# Patient Record
Sex: FEMALE | Race: WHITE | Employment: FULL TIME | URBAN - METROPOLITAN AREA
[De-identification: names, ages, dates, MRNs, and addresses within clinical notes are randomized per-mention and may not be internally consistent; named-entity substitution may affect disease eponyms.]

---

## 2017-01-06 ENCOUNTER — OFFICE VISIT (OUTPATIENT)
Dept: PSYCHOLOGY | Facility: CLINIC | Age: 32
End: 2017-01-06
Payer: COMMERCIAL

## 2017-01-06 DIAGNOSIS — F41.9 ANXIETY: Primary | ICD-10-CM

## 2017-01-06 PROCEDURE — 90834 PSYTX W PT 45 MINUTES: CPT | Performed by: MARRIAGE & FAMILY THERAPIST

## 2017-01-06 ASSESSMENT — ANXIETY QUESTIONNAIRES
3. WORRYING TOO MUCH ABOUT DIFFERENT THINGS: SEVERAL DAYS
7. FEELING AFRAID AS IF SOMETHING AWFUL MIGHT HAPPEN: NOT AT ALL
5. BEING SO RESTLESS THAT IT IS HARD TO SIT STILL: SEVERAL DAYS
6. BECOMING EASILY ANNOYED OR IRRITABLE: MORE THAN HALF THE DAYS
1. FEELING NERVOUS, ANXIOUS, OR ON EDGE: MORE THAN HALF THE DAYS
GAD7 TOTAL SCORE: 9
2. NOT BEING ABLE TO STOP OR CONTROL WORRYING: SEVERAL DAYS

## 2017-01-06 ASSESSMENT — PATIENT HEALTH QUESTIONNAIRE - PHQ9: 5. POOR APPETITE OR OVEREATING: MORE THAN HALF THE DAYS

## 2017-01-06 NOTE — PROGRESS NOTES
Progress Note    Client Name: Aimee M Welander  Date: 1/6/17         Service Type: Individual      Session Start Time: 10:00  Session End Time: 10:52      Session Length: 38-52     Session #: 10     Attendees: Client    Treatment Plan Last Reviewed: n/a  PHQ-9 / BELL-7: completed  LOCUS / CASII: n/a     DATA      Progress Since Last Session (Related to Symptoms / Goals / Homework):   Symptoms: Stable    Homework: Achieved / completed to satisfaction      Episode of Care Goals: Satisfactory progress - ACTION (Actively working towards change); Intervened by reinforcing change plan / affirming steps taken     Current / Ongoing Stressors and Concerns:  Client is proud about several boundaries she has set with people.  Continues to work on organization which helps balance emotional health.  She feels optimistic about starting a new therapist for daughter.     Treatment Objective(s) Addressed in This Session:   Client will Identify negative self-talk and behaviors: challenge core beliefs, myths, and actions.     Intervention:   CBT: wise mind strategies, cognitive modification        ASSESSMENT: Current Emotional / Mental Status (status of significant symptoms):   Risk status (Self / Other harm or suicidal ideation)    Client denies current fears or concerns for personal safety.  Client denies current or recent suicidal ideation or behaviors.   Client denies current or recent homicidal ideation or behaviors.  Client denies current or recent self injurious behavior or ideation.  Client denies other safety concerns.  Client reports there are no firearms in the house.  A safety and risk management plan has been developed.     Appearance:   Appropriate    Eye Contact:   Fair    Psychomotor Behavior: Normal    Attitude:   Cooperative    Orientation:   All   Speech    Rate / Production: Normal     Volume:  Normal    Mood:    Anxious  Normal   Affect:    Appropriate    Thought  Content:  Clear    Thought Form:  Coherent  Logical    Insight:    Fair      Medication Review:   No changes to current psychiatric medication(s)     Medication Compliance:   No client stopped all psychotropic medications     Changes in Health Issues:   None reported     Chemical Use Review:   Substance Use: Chemical use reviewed, no active concerns identified      Tobacco Use: Yes, decrease.  Client reports frequency of use daily with an e-cigarette. Provided support and affirmation for steps taken towards quiting      Collateral Reports Completed:   Not Applicable    PLAN: (Client Tasks / Therapist Tasks / Other)  1.  Focus on body reactions to stress.  2.  Set boundaries with others.      Kendra Gilliland, LMFT                                                         ________________________________________________________________________    Treatment Plan    Client's Name: Aimee M Welander  YOB: 1985    Date: 9/9/16    Diagnoses: 296.32 Major Depressive Disorder, Recurrent Episode, Moderate _  300.00 (F41.9) Unspecified Anxiety Disorder  Psychosocial & Contextual Factors: relationship conflicts, trauma history  WHODAS: 29    Referral / Collaboration:  Referral to another professional/service is not indicated at this time..    Anticipated number of session or this episode of care: 12-24      MeasurableTreatment Goal(s) related to diagnosis / functional impairment(s)  Goal 1: Client will keep self safe and eliminate suicidal ideation and self-harm behaviors.    Objective #A (Client Action)    Client will make a list of at least 10 skills or activities that you will to use to distract from urges to harm self.  Status: Completed - Date: 12/16/16     Intervention(s)  Therapist will provide ideas and strategies for generating coping skills list.    Objective #B  Client will make a list of pros and cons for tolerating and not tolerating an urge to harm self.  Status: Completed - Date: 12/16/16      Intervention(s)  Therapist will guide client through DBT-style Pros/Cons list for behavior change.    Objective #C  Client will identify and practice the new strategies for dealing with strong emotions, learn and practice relaxation breathing.  Status: Continued - Date(s): 12/16/16     Intervention(s)  Therapist will teach distraction skills. Practice them within session and outside of session.    Goal 2: Client will report reduction in anxiety also as evidenced by reduction of BELL-7 score below 6 points within the next 12 weeks.    Objective #A (Client Action)    Client will identify at least three triggers for anxiety.  Status: Completed - Date: 12/16/16     Intervention(s)  Therapist will provide educational materials on common triggers and signs of anxiety.    Objective #B  Client will use relaxation strategies at least two times per day to reduce the physical symptoms of anxiety.  Status: Continued - Date(s): 12/16/16     Intervention(s)  Therapist will teach relaxation strategies such as mindfulness, deep breathing, muscle relaxation, and sensory activities.    Objective #C  Client will use cognitive strategies identified in therapy to challenge anxious thoughts.  Status: Continued - Date(s): 12/16/16     Intervention(s)  Therapist will teach strategies for cognitive modification using REBT model.    Goal 3: Client will report improved mood as indicated by PHQ-9 score below 6 for consistent 8 weeks.    Objective #A (Client Action)    Status: Continued- Date: 12/16/16    Client will Increase interest, engagement, and pleasure in doing things.    Intervention(s)  Therapist will assign homework for daily involvement in pleasant activities.    Objective #B  Client will Identify negative self-talk and behaviors: challenge core beliefs, myths, and actions.    Status: Continued - Date(s): 12/16/16     Intervention(s)  Therapist will teach strategies for cognitive modification using REBT models.    Objective  #C  Client will Improve quantity and quality of night time sleep.  Status: Continued - Date(s):12/16/16     Intervention(s)  Therapist will teach sleep hygiene strategies.  Assign homework for daily practice.      Client has reviewed and agreed to the above plan.      Kendra Gilliland, LMFT  September 9, 2016

## 2017-01-07 ASSESSMENT — ANXIETY QUESTIONNAIRES: GAD7 TOTAL SCORE: 9

## 2017-01-07 ASSESSMENT — PATIENT HEALTH QUESTIONNAIRE - PHQ9: SUM OF ALL RESPONSES TO PHQ QUESTIONS 1-9: 12

## 2017-01-20 ENCOUNTER — OFFICE VISIT (OUTPATIENT)
Dept: PSYCHOLOGY | Facility: CLINIC | Age: 32
End: 2017-01-20
Payer: COMMERCIAL

## 2017-01-20 DIAGNOSIS — F33.1 MAJOR DEPRESSIVE DISORDER, RECURRENT EPISODE, MODERATE (H): Primary | ICD-10-CM

## 2017-01-20 PROCEDURE — 90834 PSYTX W PT 45 MINUTES: CPT | Performed by: MARRIAGE & FAMILY THERAPIST

## 2017-01-20 ASSESSMENT — ANXIETY QUESTIONNAIRES
2. NOT BEING ABLE TO STOP OR CONTROL WORRYING: MORE THAN HALF THE DAYS
5. BEING SO RESTLESS THAT IT IS HARD TO SIT STILL: SEVERAL DAYS
1. FEELING NERVOUS, ANXIOUS, OR ON EDGE: SEVERAL DAYS
3. WORRYING TOO MUCH ABOUT DIFFERENT THINGS: MORE THAN HALF THE DAYS
GAD7 TOTAL SCORE: 12
6. BECOMING EASILY ANNOYED OR IRRITABLE: MORE THAN HALF THE DAYS
7. FEELING AFRAID AS IF SOMETHING AWFUL MIGHT HAPPEN: SEVERAL DAYS

## 2017-01-20 ASSESSMENT — PATIENT HEALTH QUESTIONNAIRE - PHQ9: 5. POOR APPETITE OR OVEREATING: NEARLY EVERY DAY

## 2017-01-20 NOTE — PROGRESS NOTES
"                                           Progress Note    Client Name: Aimee M Welander  Date: 1/20/17         Service Type: Individual      Session Start Time: 10:00  Session End Time: 10:52      Session Length: 38-52     Session #: 11     Attendees: Client    Treatment Plan Last Reviewed: n/a  PHQ-9 / BELL-7: completed  LOCUS / CASII: n/a     DATA      Progress Since Last Session (Related to Symptoms / Goals / Homework):   Symptoms: Stable    Homework: Achieved / completed to satisfaction      Episode of Care Goals: Satisfactory progress - ACTION (Actively working towards change); Intervened by reinforcing change plan / affirming steps taken     Current / Ongoing Stressors and Concerns:  Client is continuing to set boundaries.  She notices a belief of \"I don't matter\" that comes when she speaks and believes no one is listening.  Image in her mind is of self on a rooftop screaming with everyone's backs turned.       Treatment Objective(s) Addressed in This Session:   Client will Identify negative self-talk and behaviors: challenge core beliefs, myths, and actions.     Intervention:   CBT: wise mind strategies, cognitive modification.  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  Brief introduction to EMDR.        ASSESSMENT: Current Emotional / Mental Status (status of significant symptoms):   Risk status (Self / Other harm or suicidal ideation)    Client denies current fears or concerns for personal safety.  Client denies current or recent suicidal ideation or behaviors.   Client denies current or recent homicidal ideation or behaviors.  Client denies current or recent self injurious behavior or ideation.  Client denies other safety concerns.  Client reports there are no firearms in the house.  A safety and risk management plan has been developed.     Appearance:   Appropriate    Eye Contact:   Fair    Psychomotor Behavior: Normal    Attitude:   Cooperative    Orientation:   All   Speech    Rate / " Production: Normal     Volume:  Normal    Mood:    Anxious  Normal   Affect:    Appropriate    Thought Content:  Clear    Thought Form:  Coherent  Logical    Insight:    Fair      Medication Review:   No changes to current psychiatric medication(s)     Medication Compliance:   No client stopped all psychotropic medications     Changes in Health Issues:   None reported     Chemical Use Review:   Substance Use: Chemical use reviewed, no active concerns identified      Tobacco Use: Yes, decrease.  Client reports frequency of use daily with an e-cigarette. Provided support and affirmation for steps taken towards quiting      Collateral Reports Completed:   Not Applicable    PLAN: (Client Tasks / Therapist Tasks / Other)  1.  Introduction to EMDR next session with resourcing practice.      Kendra Gilliland, LMFT                                                         ________________________________________________________________________    Treatment Plan    Client's Name: Aimee M Welander  YOB: 1985    Date: 9/9/16    Diagnoses: 296.32 Major Depressive Disorder, Recurrent Episode, Moderate _  300.00 (F41.9) Unspecified Anxiety Disorder  Psychosocial & Contextual Factors: relationship conflicts, trauma history  WHODAS: 29    Referral / Collaboration:  Referral to another professional/service is not indicated at this time..    Anticipated number of session or this episode of care: 12-24      MeasurableTreatment Goal(s) related to diagnosis / functional impairment(s)  Goal 1: Client will keep self safe and eliminate suicidal ideation and self-harm behaviors.    Objective #A (Client Action)    Client will make a list of at least 10 skills or activities that you will to use to distract from urges to harm self.  Status: Completed - Date: 12/16/16     Intervention(s)  Therapist will provide ideas and strategies for generating coping skills list.    Objective #B  Client will make a list of pros and cons for  tolerating and not tolerating an urge to harm self.  Status: Completed - Date: 12/16/16     Intervention(s)  Therapist will guide client through DBT-style Pros/Cons list for behavior change.    Objective #C  Client will identify and practice the new strategies for dealing with strong emotions, learn and practice relaxation breathing.  Status: Continued - Date(s): 12/16/16     Intervention(s)  Therapist will teach distraction skills. Practice them within session and outside of session.    Goal 2: Client will report reduction in anxiety also as evidenced by reduction of BELL-7 score below 6 points within the next 12 weeks.    Objective #A (Client Action)    Client will identify at least three triggers for anxiety.  Status: Completed - Date: 12/16/16     Intervention(s)  Therapist will provide educational materials on common triggers and signs of anxiety.    Objective #B  Client will use relaxation strategies at least two times per day to reduce the physical symptoms of anxiety.  Status: Continued - Date(s): 12/16/16     Intervention(s)  Therapist will teach relaxation strategies such as mindfulness, deep breathing, muscle relaxation, and sensory activities.    Objective #C  Client will use cognitive strategies identified in therapy to challenge anxious thoughts.  Status: Continued - Date(s): 12/16/16     Intervention(s)  Therapist will teach strategies for cognitive modification using REBT model.    Goal 3: Client will report improved mood as indicated by PHQ-9 score below 6 for consistent 8 weeks.    Objective #A (Client Action)    Status: Continued- Date: 12/16/16    Client will Increase interest, engagement, and pleasure in doing things.    Intervention(s)  Therapist will assign homework for daily involvement in pleasant activities.    Objective #B  Client will Identify negative self-talk and behaviors: challenge core beliefs, myths, and actions.    Status: Continued - Date(s): 12/16/16      Intervention(s)  Therapist will teach strategies for cognitive modification using REBT models.    Objective #C  Client will Improve quantity and quality of night time sleep.  Status: Continued - Date(s):12/16/16     Intervention(s)  Therapist will teach sleep hygiene strategies.  Assign homework for daily practice.      Client has reviewed and agreed to the above plan.      Kendra Gilliland, LMFT  September 9, 2016

## 2017-01-21 ASSESSMENT — ANXIETY QUESTIONNAIRES: GAD7 TOTAL SCORE: 12

## 2017-01-21 ASSESSMENT — PATIENT HEALTH QUESTIONNAIRE - PHQ9: SUM OF ALL RESPONSES TO PHQ QUESTIONS 1-9: 8

## 2017-01-25 ENCOUNTER — MYC MEDICAL ADVICE (OUTPATIENT)
Dept: FAMILY MEDICINE | Facility: CLINIC | Age: 32
End: 2017-01-25

## 2017-01-25 DIAGNOSIS — F41.9 ANXIETY: Primary | ICD-10-CM

## 2017-02-03 ENCOUNTER — OFFICE VISIT (OUTPATIENT)
Dept: PSYCHOLOGY | Facility: CLINIC | Age: 32
End: 2017-02-03
Payer: COMMERCIAL

## 2017-02-03 DIAGNOSIS — F33.1 MAJOR DEPRESSIVE DISORDER, RECURRENT EPISODE, MODERATE (H): Primary | ICD-10-CM

## 2017-02-03 PROCEDURE — 90834 PSYTX W PT 45 MINUTES: CPT | Performed by: MARRIAGE & FAMILY THERAPIST

## 2017-02-03 ASSESSMENT — ANXIETY QUESTIONNAIRES
3. WORRYING TOO MUCH ABOUT DIFFERENT THINGS: MORE THAN HALF THE DAYS
7. FEELING AFRAID AS IF SOMETHING AWFUL MIGHT HAPPEN: NOT AT ALL
1. FEELING NERVOUS, ANXIOUS, OR ON EDGE: MORE THAN HALF THE DAYS
2. NOT BEING ABLE TO STOP OR CONTROL WORRYING: MORE THAN HALF THE DAYS
5. BEING SO RESTLESS THAT IT IS HARD TO SIT STILL: SEVERAL DAYS
GAD7 TOTAL SCORE: 11
6. BECOMING EASILY ANNOYED OR IRRITABLE: NEARLY EVERY DAY

## 2017-02-03 ASSESSMENT — PATIENT HEALTH QUESTIONNAIRE - PHQ9: 5. POOR APPETITE OR OVEREATING: SEVERAL DAYS

## 2017-02-03 NOTE — PROGRESS NOTES
"                                           Progress Note    Client Name: Aimee M Welander  Date: 2/3/17         Service Type: Individual      Session Start Time: 10:00  Session End Time: 10:43      Session Length: 38-52     Session #: 12     Attendees: Client    Treatment Plan Last Reviewed: n/a  PHQ-9 / BELL-7: completed  LOCUS / CASII: n/a     DATA      Progress Since Last Session (Related to Symptoms / Goals / Homework):   Symptoms: Stable    Homework: Achieved / completed to satisfaction      Episode of Care Goals: Satisfactory progress - ACTION (Actively working towards change); Intervened by reinforcing change plan / affirming steps taken     Current / Ongoing Stressors and Concerns:  Client is eager to start EMDR after researching it independently.  She was receptive to the [facility] and identified a cue word \"secure.\"     Treatment Objective(s) Addressed in This Session:   Client will successfully maintain focus during imageries such as Container, Calm Place, and Light Stream.     Intervention:   EMDR: Introduction to EMDR; [facility]m Place imagery with bilateral stimulation.          ASSESSMENT: Current Emotional / Mental Status (status of significant symptoms):   Risk status (Self / Other harm or suicidal ideation)    Client denies current fears or concerns for personal safety.  Client denies current or recent suicidal ideation or behaviors.   Client denies current or recent homicidal ideation or behaviors.  Client denies current or recent self injurious behavior or ideation.  Client denies other safety concerns.  Client reports there are no firearms in the house.  A safety and risk management plan has been developed.     Appearance:   Appropriate    Eye Contact:   Fair    Psychomotor Behavior: Normal    Attitude:   Cooperative    Orientation:   All   Speech    Rate / Production: Normal     Volume:  Normal    Mood:    Anxious  Normal   Affect:    Appropriate    Thought Content:  Clear    Thought Form:  Coherent  " Logical    Insight:    Fair      Medication Review:   No changes to current psychiatric medication(s)     Medication Compliance:   No client stopped all psychotropic medications     Changes in Health Issues:   None reported     Chemical Use Review:   Substance Use: Chemical use reviewed, no active concerns identified      Tobacco Use: Yes, decrease.  Client reports frequency of use daily with an e-cigarette. Provided support and affirmation for steps taken towards quiting      Collateral Reports Completed:   Not Applicable    PLAN: (Client Tasks / Therapist Tasks / Other)  1. Use Calm Place imagery.  2.  Consider other resourcing imageries or start treatment plan sequencing.    Kendra Gilliland, LMFT                                                         ________________________________________________________________________    Treatment Plan    Client's Name: Aimee M Welander  YOB: 1985    Date: 9/9/16    Diagnoses: 296.32 Major Depressive Disorder, Recurrent Episode, Moderate _  300.00 (F41.9) Unspecified Anxiety Disorder  Psychosocial & Contextual Factors: relationship conflicts, trauma history  WHODAS: 29    Referral / Collaboration:  Referral to another professional/service is not indicated at this time..    Anticipated number of session or this episode of care: 12-24      MeasurableTreatment Goal(s) related to diagnosis / functional impairment(s)  Goal 1: Client will keep self safe and eliminate suicidal ideation and self-harm behaviors.    Objective #A (Client Action)    Client will make a list of at least 10 skills or activities that you will to use to distract from urges to harm self.  Status: Completed - Date: 12/16/16     Intervention(s)  Therapist will provide ideas and strategies for generating coping skills list.    Objective #B  Client will make a list of pros and cons for tolerating and not tolerating an urge to harm self.  Status: Completed - Date: 12/16/16      Intervention(s)  Therapist will guide client through DBT-style Pros/Cons list for behavior change.    Objective #C  Client will identify and practice the new strategies for dealing with strong emotions, learn and practice relaxation breathing.  Status: Continued - Date(s): 12/16/16     Intervention(s)  Therapist will teach distraction skills. Practice them within session and outside of session.    Goal 2: Client will report reduction in anxiety also as evidenced by reduction of BELL-7 score below 6 points within the next 12 weeks.    Objective #A (Client Action)    Client will identify at least three triggers for anxiety.  Status: Completed - Date: 12/16/16     Intervention(s)  Therapist will provide educational materials on common triggers and signs of anxiety.    Objective #B  Client will use relaxation strategies at least two times per day to reduce the physical symptoms of anxiety.  Status: Continued - Date(s): 12/16/16     Intervention(s)  Therapist will teach relaxation strategies such as mindfulness, deep breathing, muscle relaxation, and sensory activities.    Objective #C  Client will use cognitive strategies identified in therapy to challenge anxious thoughts.  Status: Continued - Date(s): 12/16/16     Intervention(s)  Therapist will teach strategies for cognitive modification using REBT model.    Goal 3: Client will report improved mood as indicated by PHQ-9 score below 6 for consistent 8 weeks.    Objective #A (Client Action)    Status: Continued- Date: 12/16/16    Client will Increase interest, engagement, and pleasure in doing things.    Intervention(s)  Therapist will assign homework for daily involvement in pleasant activities.    Objective #B  Client will Identify negative self-talk and behaviors: challenge core beliefs, myths, and actions.    Status: Continued - Date(s): 12/16/16     Intervention(s)  Therapist will teach strategies for cognitive modification using REBT models.    Objective  #C  Client will Improve quantity and quality of night time sleep.  Status: Continued - Date(s):12/16/16     Intervention(s)  Therapist will teach sleep hygiene strategies.  Assign homework for daily practice.    Goal 4: Client will report reduced trauma responses including decreased re-experiencing, decreased avoidance, and decreased hyperarousal.    Objective #A (Client Action)    Client will successful maintain focus during imageries such as Container, Calm Place, and Light Stream.  Status: New - Date: 2/3/17     Intervention(s)  Therapist will provide practice of imagery and mindfulness during session.  Assign homework for practice of the imagery outside of session.    Objective #B  Client will provide history of trauma or distressing events and how these impact the cognitions and feelings about self.    Status: New - Date: 2/3/17     Intervention(s)  Therapist will assist client in identifying how the past stressors are impacting currenting cognitive, behavioral, and emotional functioning.    Objective #C  Client will use bilateral stimulation while recalling distressing memories and focusing on new, helpful thoughts and feelings.  Status: New - Date: 2/3/17     Intervention(s)  Therapist will provide bilateral stimulation through touch or eye movement and elicit feedback from client about SUDs scale ratings.      Client has reviewed and agreed to the above plan.      Kendra Gilliland, LMFT  September 9, 2016

## 2017-02-04 ASSESSMENT — PATIENT HEALTH QUESTIONNAIRE - PHQ9: SUM OF ALL RESPONSES TO PHQ QUESTIONS 1-9: 7

## 2017-02-04 ASSESSMENT — ANXIETY QUESTIONNAIRES: GAD7 TOTAL SCORE: 11

## 2017-02-09 ENCOUNTER — OFFICE VISIT (OUTPATIENT)
Dept: INTERNAL MEDICINE | Facility: CLINIC | Age: 32
End: 2017-02-09
Payer: COMMERCIAL

## 2017-02-09 ENCOUNTER — TELEPHONE (OUTPATIENT)
Dept: INTERNAL MEDICINE | Facility: CLINIC | Age: 32
End: 2017-02-09

## 2017-02-09 VITALS
WEIGHT: 194 LBS | SYSTOLIC BLOOD PRESSURE: 113 MMHG | HEART RATE: 82 BPM | BODY MASS INDEX: 32.32 KG/M2 | HEIGHT: 65 IN | OXYGEN SATURATION: 98 % | DIASTOLIC BLOOD PRESSURE: 70 MMHG | TEMPERATURE: 97.6 F

## 2017-02-09 DIAGNOSIS — N89.8 VAGINAL ITCHING: ICD-10-CM

## 2017-02-09 DIAGNOSIS — R35.0 URINARY FREQUENCY: Primary | ICD-10-CM

## 2017-02-09 DIAGNOSIS — J45.20 INTERMITTENT ASTHMA, UNCOMPLICATED: ICD-10-CM

## 2017-02-09 LAB
ALBUMIN UR-MCNC: NEGATIVE MG/DL
APPEARANCE UR: NORMAL
BILIRUB UR QL STRIP: NEGATIVE
COLOR UR AUTO: YELLOW
GLUCOSE UR STRIP-MCNC: NEGATIVE MG/DL
HGB UR QL STRIP: NEGATIVE
KETONES UR STRIP-MCNC: NEGATIVE MG/DL
LEUKOCYTE ESTERASE UR QL STRIP: NEGATIVE
MICRO REPORT STATUS: ABNORMAL
NITRATE UR QL: NEGATIVE
NON-SQ EPI CELLS #/AREA URNS LPF: NORMAL /LPF
PH UR STRIP: 7 PH (ref 5–7)
RBC #/AREA URNS AUTO: NORMAL /HPF (ref 0–2)
SP GR UR STRIP: 1.02 (ref 1–1.03)
SPECIMEN SOURCE: ABNORMAL
URN SPEC COLLECT METH UR: NORMAL
UROBILINOGEN UR STRIP-ACNC: 0.2 EU/DL (ref 0.2–1)
WBC #/AREA URNS AUTO: NORMAL /HPF (ref 0–2)
WET PREP SPEC: ABNORMAL

## 2017-02-09 PROCEDURE — 99213 OFFICE O/P EST LOW 20 MIN: CPT | Performed by: INTERNAL MEDICINE

## 2017-02-09 PROCEDURE — 87210 SMEAR WET MOUNT SALINE/INK: CPT | Performed by: INTERNAL MEDICINE

## 2017-02-09 PROCEDURE — 81001 URINALYSIS AUTO W/SCOPE: CPT | Performed by: INTERNAL MEDICINE

## 2017-02-09 RX ORDER — METRONIDAZOLE 500 MG/1
500 TABLET ORAL 2 TIMES DAILY
Qty: 14 TABLET | Refills: 0 | Status: SHIPPED | OUTPATIENT
Start: 2017-02-09 | End: 2017-04-28

## 2017-02-09 RX ORDER — ALBUTEROL SULFATE 90 UG/1
2 AEROSOL, METERED RESPIRATORY (INHALATION) EVERY 4 HOURS PRN
Qty: 1 INHALER | Refills: 12 | Status: SHIPPED | OUTPATIENT
Start: 2017-02-09 | End: 2018-11-23

## 2017-02-09 ASSESSMENT — ASTHMA QUESTIONNAIRES
QUESTION_3 LAST FOUR WEEKS HOW OFTEN DID YOUR ASTHMA SYMPTOMS (WHEEZING, COUGHING, SHORTNESS OF BREATH, CHEST TIGHTNESS OR PAIN) WAKE YOU UP AT NIGHT OR EARLIER THAN USUAL IN THE MORNING: NOT AT ALL
QUESTION_2 LAST FOUR WEEKS HOW OFTEN HAVE YOU HAD SHORTNESS OF BREATH: ONCE A DAY
QUESTION_4 LAST FOUR WEEKS HOW OFTEN HAVE YOU USED YOUR RESCUE INHALER OR NEBULIZER MEDICATION (SUCH AS ALBUTEROL): NOT AT ALL
ACT_TOTALSCORE: 18
QUESTION_1 LAST FOUR WEEKS HOW MUCH OF THE TIME DID YOUR ASTHMA KEEP YOU FROM GETTING AS MUCH DONE AT WORK, SCHOOL OR AT HOME: SOME OF THE TIME
QUESTION_5 LAST FOUR WEEKS HOW WOULD YOU RATE YOUR ASTHMA CONTROL: SOMEWHAT CONTROLLED

## 2017-02-09 NOTE — PATIENT INSTRUCTIONS
Please take the antibiotics and let us know if you are not better after you complete the course.  You may use acetaminophen, heating packs or ice packs for your abdominal pain.       Bacterial Vaginosis    You have a vaginal infection called bacterial vaginosis (BV). Both good and bad bacteria are present in a healthy vagina. BV occurs when these bacteria get out of balance. The number of bad bacteria increase. And the number of good bacteria decrease.  BV may or may not cause symptoms. If symptoms do occur, they can include:    Thin, gray, milky-white, or sometimes green discharge    Unpleasant odor or  fishy  smell    Itching, burning, or pain in or around the vagina  It is not known what causes BV, but certain factors can make the problem more likely. This can include:    Douching    Having sex with a new partner    Having sex with more than one partner  BV will sometimes go away on its own. But treatment is usually recommended. This is because untreated BV can increase the risk of more serious health problems such as:    Pelvic inflammatory disease (PID)     delivery (giving birth to a baby early if you re pregnant)    HIV and certain other sexually transmitted diseases (STDs)    Infection after surgery on the reproductive organs  Home care  General care    BV is most often treated with medicines called antibiotics. These may be given as pills or as a vaginal cream. If antibiotics are prescribed, be sure to use them exactly as directed. Also, be sure to complete all of the medicine, even if your symptoms go away.    Avoid douching or having sex during treatment.    If you have sex with a female partner, ask your healthcare provider if she should also be treated.  Prevention    Limit or avoid douching.    Avoid having sex. If you do have sex, then take steps to lower your risk:    Use condoms when having sex.    Limit the number of partners you have sex with.  Follow-up care  Follow up with your healthcare  provider, or as advised.  When to seek medical advice  Call your healthcare provider right away if:    You have a fever of 100.4 F (38 C) or higher, or as directed by your provider.    Your symptoms worsen, or they don t go away within a few days of starting treatment.    You have new pain in the lower belly or pelvic region.    You have side effects that bother you or a reaction to the pills or cream you re prescribed.    You or any partners you have sex with have new symptoms, such as a rash, joint pain, or sores.    3926-8970 [employer]. [address] [place] [id number]. All rights reserved. This information is not intended as a substitute for professional medical care. Always follow your healthcare professional's instructions.

## 2017-02-09 NOTE — NURSING NOTE
"Chief Complaint   Patient presents with     UTI     possible bladder infection       Initial /70 mmHg  Pulse 82  Temp(Src) 97.6  F (36.4  C) (Oral)  Ht 5' 4.5\" (1.638 m)  Wt 194 lb (87.998 kg)  BMI 32.80 kg/m2  SpO2 98%  LMP 01/12/2015 (Approximate) Estimated body mass index is 32.8 kg/(m^2) as calculated from the following:    Height as of this encounter: 5' 4.5\" (1.638 m).    Weight as of this encounter: 194 lb (87.998 kg).  BP completed using cuff size: marion Ford CMA    "

## 2017-02-09 NOTE — MR AVS SNAPSHOT
After Visit Summary   2017    Aimee M Welander    MRN: [id number]           Patient Information     Date Of Birth          1985        Visit Information        Provider Department      2017 1:30 PM Makenzei James MD [facility]        Today's Diagnoses     Urinary frequency    -  1     Intermittent asthma, uncomplicated         Vaginal itching           Care Instructions    Please take the antibiotics and let us know if you are not better after you complete the course.  You may use acetaminophen, heating packs or ice packs for your abdominal pain.       Bacterial Vaginosis    You have a vaginal infection called bacterial vaginosis (BV). Both good and bad bacteria are present in a healthy vagina. BV occurs when these bacteria get out of balance. The number of bad bacteria increase. And the number of good bacteria decrease.  BV may or may not cause symptoms. If symptoms do occur, they can include:    Thin, gray, milky-white, or sometimes green discharge    Unpleasant odor or  fishy  smell    Itching, burning, or pain in or around the vagina  It is not known what causes BV, but certain factors can make the problem more likely. This can include:    Douching    Having sex with a new partner    Having sex with more than one partner  BV will sometimes go away on its own. But treatment is usually recommended. This is because untreated BV can increase the risk of more serious health problems such as:    Pelvic inflammatory disease (PID)     delivery (giving birth to a baby early if you re pregnant)    HIV and certain other sexually transmitted diseases (STDs)    Infection after surgery on the reproductive organs  Home care  General care    BV is most often treated with medicines called antibiotics. These may be given as pills or as a vaginal cream. If antibiotics are prescribed, be sure to use them exactly as directed. Also, be sure to complete all of the medicine,  even if your symptoms go away.    Avoid douching or having sex during treatment.    If you have sex with a female partner, ask your healthcare provider if she should also be treated.  Prevention    Limit or avoid douching.    Avoid having sex. If you do have sex, then take steps to lower your risk:    Use condoms when having sex.    Limit the number of partners you have sex with.  Follow-up care  Follow up with your healthcare provider, or as advised.  When to seek medical advice  Call your healthcare provider right away if:    You have a fever of 100.4 F (38 C) or higher, or as directed by your provider.    Your symptoms worsen, or they don t go away within a few days of starting treatment.    You have new pain in the lower belly or pelvic region.    You have side effects that bother you or a reaction to the pills or cream you re prescribed.    You or any partners you have sex with have new symptoms, such as a rash, joint pain, or sores.    5707-7713 [employer]. [address] [place] [address]. All rights reserved. This information is not intended as a substitute for professional medical care. Always follow your healthcare professional's instructions.              Follow-ups after your visit        Your next 10 appointments already scheduled     Feb 17, 2017 11:00 AM   Return Visit with Kendra Gilliland [facility]    [address] [facility] [address] [phone]            Mar 03, 2017 10:00 AM   Return Visit with Kendra Gilliland [facility] ([facility])    [address] [facility] [id number] [phone]            Mar 17, 2017 11:00 AM   Return Visit with Kendra Gilliland [facility]    [address] [facility] [id number] [phone]              Who to contact     If you have questions or need follow up information about  "today's clinic visit or your schedule please contact [facility] directly at [phone].  Normal or non-critical lab and imaging results will be communicated to you by MyChart, letter or phone within 4 business days after the clinic has received the results. If you do not hear from us within 7 days, please contact the clinic through [employer] or phone. If you have a critical or abnormal lab result, we will notify you by phone as soon as possible.  Submit refill requests through [employer] or call your pharmacy and they will forward the refill request to us. Please allow 3 business days for your refill to be completed.          Additional Information About Your Visit        [employer]ap[employer]ar[employer]Angel Information     [employer] gives you secure access to your electronic health record. If you see a primary care provider, you can also send messages to your care team and make appointments. If you have questions, please call your primary care clinic.  If you do not have a primary care provider, please call [phone] and they will assist you.        Care EveryWhere ID     This is your Care EveryWhere ID. This could be used by other organizations to access your [facility] medical records  [id number]        Your Vitals Were     Pulse Temperature Height BMI (Body Mass Index) Pulse Oximetry Last Period    82 97.6  F (36.4  C) (Oral) 5' 4.5\" (1.638 m) 32.80 kg/m2 98% 01/12/2015 (Approximate)       Blood Pressure from Last 3 Encounters:   02/09/17 113/70   12/16/16 121/78   08/08/16 129/83    Weight from Last 3 Encounters:   02/09/17 194 lb (87.998 kg)   12/16/16 193 lb 3.2 oz (87.635 kg)   08/08/16 185 lb 12.8 oz (84.278 kg)              We Performed the Following     *UA [facility]scopic [facility] [place] [facility] (except [place] and [place])     Asthma Action Plan (AAP)     Urine Microscopic     Wet prep          Today's Medication Changes          These changes are accurate as of: 2/9/17  3:25 PM.  If " you have any questions, ask your nurse or doctor.               Start taking these medicines.        Dose/Directions    albuterol 108 (90 BASE) MCG/ACT Inhaler   Commonly known as:  VENTOLIN HFA   Used for:  Intermittent asthma, uncomplicated   Started by:  Makenzie James MD        Dose:  2 puff   Inhale 2 puffs into the lungs every 4 hours as needed for shortness of breath / dyspnea or wheezing   Quantity:  1 Inhaler   Refills:  12       metroNIDAZOLE 500 MG tablet   Commonly known as:  FLAGYL   Used for:  Vaginal itching   Started by:  Makenzie James MD        Dose:  500 mg   Take 1 tablet (500 mg) by mouth 2 times daily   Quantity:  14 tablet   Refills:  0            Where to get your medicines      These medications were sent to [place] [address] [place] [facility] [address] [facility]  [address] [facility] [address]     Phone:  [phone]    - albuterol 108 (90 BASE) MCG/ACT Inhaler  - metroNIDAZOLE 500 MG tablet             Primary Care Provider Office Phone # Fax #    Corey Umanzor [phone]       [facility] [address] [facility] [address]        Thank you!     Thank you for choosing [facility]  for your care. Our goal is always to provide you with excellent care. Hearing back from our patients is one way we can continue to improve our services. Please take a few minutes to complete the written survey that you may receive in the mail after your visit with us. Thank you!             Your Updated Medication List - Protect others around you: Learn how to safely use, store and throw away your medicines at www.disposemymeds.org.          This list is accurate as of: 2/9/17  3:25 PM.  Always use your most recent med list.                   Brand Name Dispense Instructions for use    albuterol 108 (90 BASE) MCG/ACT Inhaler    VENTOLIN HFA    1 Inhaler    Inhale 2 puffs into the lungs every 4 hours as needed for  shortness of breath / dyspnea or wheezing       ALPRAZolam 0.5 MG tablet    XANAX    15 tablet    Take 1 tablet (0.5 mg) by mouth 3 times daily as needed for anxiety (#15 to last one month at least)       metroNIDAZOLE 500 MG tablet    FLAGYL    14 tablet    Take 1 tablet (500 mg) by mouth 2 times daily       nystatin-triamcinolone ointment    MYCOLOG    30 g    Apply topically 2 times daily

## 2017-02-09 NOTE — PROGRESS NOTES
SUBJECTIVE:                                                    Aimee M Welander is a 31 year old female who presents to clinic today for the following health issues:      URINARY TRACT SYMPTOMS      Duration: 2 days    Description  dysuria, frequency, urgency, nocturia x 4 times per night. She reports that everything started with a back pain. This pain is similar to after her hysterectomy for endometriosis 2 yrs ago.  She has been told that she was at risk for intersitial cystitis.    Intensity:  10    Accompanying signs and symptoms:  Fever/chills: YES- earlier today: she felt really warm but did not take her temp   Flank pain YES  Nausea and vomiting: YES- nausea  Vaginal symptoms:  No abnormal vaginal discharge but she has had some itching.  Abdominal/Pelvic Pain: no     History  History of frequent UTI's: YES- on occasion  History of kidney stones: no   Sexually Active: YES  Possibility of pregnancy: No    Precipitating or alleviating factors: None    Therapies tried and outcome: increase fluid intake and ibuprofen   Outcome: didn't help     She uses the Mycolog cream on the outside of the vagina (rxed by O/b/Gyn)-last used a month ago and current symtpoms are dfft.         Problem list and histories reviewed & adjusted, as indicated.  Additional history: as documented    Patient Active Problem List   Diagnosis     CARDIOVASCULAR SCREENING; LDL GOAL LESS THAN 160     Intermittent asthma     Anxiety     Hair loss     Hirsutism     TAI (obstructive sleep apnea)     Post-operative pain     Mild episode of recurrent major depressive disorder (H)     Major depressive disorder, recurrent episode, moderate (H)     Past Surgical History   Procedure Laterality Date     Gyn surgery            Davinci hysterectomy total, salpingectomy bilateral N/A 2015     Procedure: DAVINCI HYSTERECTOMY TOTAL, SALPINGECTOMY BILATERAL;  Surgeon: Sanchez Cortes MD;  Location: [facility] OR      Abdomen surgery  08  "and 2015      and hysterectomy       Social History   Substance Use Topics     Smoking status: Former Smoker     Packs/day: 0.50     Years: 10.00     Types: Cigarettes     Quit date: 1/10/2011     Smokeless tobacco: Never Used     Alcohol use 0.0 oz/week     0 Standard drinks or equivalent per week      Comment: on occassion     Family History   Problem Relation Age of Onset     Arthritis Mother      CANCER Paternal Uncle      leukemia     CANCER       cousin with leukemia     Neurologic Disorder Brother      Spina Bifida     DIABETES Father      DIABETES Paternal Grandfather      Breast Cancer Mother      Depression Mother      Depression Father      Depression Sister      Depression Brother      Anxiety Disorder Mother      Anxiety Disorder Sister      MENTAL ILLNESS Mother      Substance Abuse Mother      Anesthesia Reaction Mother          Current Outpatient Prescriptions   Medication Sig Dispense Refill     albuterol (VENTOLIN HFA) 108 (90 BASE) MCG/ACT Inhaler Inhale 2 puffs into the lungs every 4 hours as needed for shortness of breath / dyspnea or wheezing 1 Inhaler 12     metroNIDAZOLE (FLAGYL) 500 MG tablet Take 1 tablet (500 mg) by mouth 2 times daily 14 tablet 0     ALPRAZolam (XANAX) 0.5 MG tablet Take 1 tablet (0.5 mg) by mouth 3 times daily as needed for anxiety (#15 to last one month at least) 15 tablet 0     nystatin-triamcinolone (MYCOLOG) ointment Apply topically 2 times daily 30 g 0     ==============================================================  ROS:   Constitutional, HEENT, cardiovascular, pulmonary, GI, , musculoskeletal, neuro, skin, endocrine and psych systems are negative, except as otherwise noted.       OBJECTIVE:                                                    /70  Pulse 82  Temp 97.6  F (36.4  C) (Oral)  Ht 5' 4.5\" (1.638 m)  Wt 194 lb (88 kg)  LMP 2015 (Approximate)  SpO2 98%  BMI 32.79 kg/m2  Body mass index is 32.79 kg/(m^2).     GEN: not in acute " distress  ABD: soft NT ND nl BS    Results for orders placed or performed in visit on 02/09/17   *UA reflex to Microscopic and Culture ([facility] (except [place] and [place])   Result Value Ref Range    Color Urine Yellow     Appearance Urine Slightly Cloudy     Glucose Urine Negative NEG mg/dL    Bilirubin Urine Negative NEG    Ketones Urine Negative NEG mg/dL    Specific Gravity Urine 1.020 1.003 - 1.035    Blood Urine Negative NEG    pH Urine 7.0 5.0 - 7.0 pH    Protein Albumin Urine Negative NEG mg/dL    Urobilinogen Urine 0.2 0.2 - 1.0 EU/dL    Nitrite Urine Negative NEG    Leukocyte Esterase Urine Negative NEG    Source Midstream Urine    Urine Microscopic   Result Value Ref Range    WBC Urine O - 2 0 - 2 /HPF    RBC Urine O - 2 0 - 2 /HPF    Squamous Epithelial /LPF Urine Few FEW /LPF   Wet prep   Result Value Ref Range    Specimen Description Cervix     Wet Prep (A)      Clue cells seen  No Trichomonas seen  No yeast seen      Micro Report Status FINAL 02/09/2017         ASSESSMENT/PLAN:                                                        ICD-10-CM    1. Urinary frequency R35.0 *UA reflex to Microscopic and Culture ([facility] (except [place] and [place])     Urine Microscopic   2. Intermittent asthma, uncomplicated J45.20 albuterol (VENTOLIN HFA) 108 (90 BASE) MCG/ACT Inhaler   3. Vaginal itching L29.8 Wet prep     metroNIDAZOLE (FLAGYL) 500 MG tablet       Patient Instructions   Please take the antibiotics and let us know if you are not better after you complete the course.  You may use acetaminophen, heating packs or ice packs for your abdominal pain.       Bacterial Vaginosis    You have a vaginal infection called bacterial vaginosis (BV). Both good and bad bacteria are present in a healthy vagina. BV occurs when these bacteria get out of balance. The number of bad bacteria increase. And the number of good bacteria decrease.  BV may or may  not cause symptoms. If symptoms do occur, they can include:    Thin, gray, milky-white, or sometimes green discharge    Unpleasant odor or  fishy  smell    Itching, burning, or pain in or around the vagina  It is not known what causes BV, but certain factors can make the problem more likely. This can include:    Douching    Having sex with a new partner    Having sex with more than one partner  BV will sometimes go away on its own. But treatment is usually recommended. This is because untreated BV can increase the risk of more serious health problems such as:    Pelvic inflammatory disease (PID)     delivery (giving birth to a baby early if you re pregnant)    HIV and certain other sexually transmitted diseases (STDs)    Infection after surgery on the reproductive organs  Home care  General care    BV is most often treated with medicines called antibiotics. These may be given as pills or as a vaginal cream. If antibiotics are prescribed, be sure to use them exactly as directed. Also, be sure to complete all of the medicine, even if your symptoms go away.    Avoid douching or having sex during treatment.    If you have sex with a female partner, ask your healthcare provider if she should also be treated.  Prevention    Limit or avoid douching.    Avoid having sex. If you do have sex, then take steps to lower your risk:    Use condoms when having sex.    Limit the number of partners you have sex with.  Follow-up care  Follow up with your healthcare provider, or as advised.  When to seek medical advice  Call your healthcare provider right away if:    You have a fever of 100.4 F (38 C) or higher, or as directed by your provider.    Your symptoms worsen, or they don t go away within a few days of starting treatment.    You have new pain in the lower belly or pelvic region.    You have side effects that bother you or a reaction to the pills or cream you re prescribed.    You or any partners you have sex with have  new symptoms, such as a rash, joint pain, or sores.    2206-3432 [employer]. [address] [place] [address], [place] [address]. All rights reserved. This information is not intended as a substitute for professional medical care. Always follow your healthcare professional's instructions.                    Makenzie James MD  [facility]

## 2017-02-10 ASSESSMENT — ASTHMA QUESTIONNAIRES: ACT_TOTALSCORE: 18

## 2017-02-10 ASSESSMENT — PATIENT HEALTH QUESTIONNAIRE - PHQ9: SUM OF ALL RESPONSES TO PHQ QUESTIONS 1-9: 11

## 2017-02-10 NOTE — PROGRESS NOTES
Quick Note:    Discussed test results with patient while in office. Dr Makenzie James MD.     ______

## 2017-02-15 ENCOUNTER — E-VISIT (OUTPATIENT)
Dept: OBGYN | Facility: CLINIC | Age: 32
End: 2017-02-15
Payer: COMMERCIAL

## 2017-02-15 DIAGNOSIS — J01.80 ACUTE NON-RECURRENT SINUSITIS OF OTHER SINUS: Primary | ICD-10-CM

## 2017-02-15 PROCEDURE — 99444 ZZC PHYSICIAN ONLINE EVALUATION & MANAGEMENT SERVICE: CPT | Performed by: NURSE PRACTITIONER

## 2017-02-15 RX ORDER — AZITHROMYCIN 250 MG/1
TABLET, FILM COATED ORAL
Qty: 6 TABLET | Refills: 0 | Status: SHIPPED | OUTPATIENT
Start: 2017-02-15 | End: 2017-04-28

## 2017-02-17 ENCOUNTER — OFFICE VISIT (OUTPATIENT)
Dept: PSYCHOLOGY | Facility: CLINIC | Age: 32
End: 2017-02-17
Payer: COMMERCIAL

## 2017-02-17 DIAGNOSIS — F41.9 ANXIETY: Primary | ICD-10-CM

## 2017-02-17 PROCEDURE — 90834 PSYTX W PT 45 MINUTES: CPT | Performed by: MARRIAGE & FAMILY THERAPIST

## 2017-02-17 NOTE — PROGRESS NOTES
"                                           Progress Note    Client Name: Aimee M Welander  Date: 2/17/17         Service Type: Individual      Session Start Time: 11:00  Session End Time: 11:42      Session Length: 38-52     Session #: 13     Attendees: Client    Treatment Plan Last Reviewed: n/a  PHQ-9 / BELL-7: completed  LOCUS / CASII: n/a     DATA      Progress Since Last Session (Related to Symptoms / Goals / Homework):   Symptoms: Stable    Homework: Achieved / completed to satisfaction      Episode of Care Goals: Satisfactory progress - ACTION (Actively working towards change); Intervened by reinforcing change plan / affirming steps taken     Current / Ongoing Stressors and Concerns:  Client is eager to start EMDR.  Identified theme of negative beliefs surrounding \"I'm not worth it.\"  She has multiple memories from early childhood during which she believed this.  Client is feeling more \"worth it\" socially and relationally lately.       Treatment Objective(s) Addressed in This Session:   EMDR history taking     Intervention:   EMDR: treatment planning sequence.          ASSESSMENT: Current Emotional / Mental Status (status of significant symptoms):   Risk status (Self / Other harm or suicidal ideation)    Client denies current fears or concerns for personal safety.  Client denies current or recent suicidal ideation or behaviors.   Client denies current or recent homicidal ideation or behaviors.  Client denies current or recent self injurious behavior or ideation.  Client denies other safety concerns.  Client reports there are no firearms in the house.  A safety and risk management plan has been developed.     Appearance:   Appropriate    Eye Contact:   Fair    Psychomotor Behavior: Normal    Attitude:   Cooperative    Orientation:   All   Speech    Rate / Production: Normal     Volume:  Normal    Mood:    Anxious  Normal   Affect:    Appropriate    Thought Content:  Clear    Thought Form:  Coherent  Logical "    Insight:    Fair      Medication Review:   No changes to current psychiatric medication(s)     Medication Compliance:   No client stopped all psychotropic medications     Changes in Health Issues:   None reported     Chemical Use Review:   Substance Use: Chemical use reviewed, no active concerns identified      Tobacco Use: Yes, decrease.  Client reports frequency of use daily with an e-cigarette. Provided support and affirmation for steps taken towards quiting      Collateral Reports Completed:   Not Applicable    PLAN: (Client Tasks / Therapist Tasks / Other)  1. Start EMDR reprocessing    Kendra MARSHElsa Gilliland, LMFT                                                         ________________________________________________________________________    Treatment Plan    Client's Name: Aimee M Welander  YOB: 1985    Date: 9/9/16    Diagnoses: 296.32 Major Depressive Disorder, Recurrent Episode, Moderate _  300.00 (F41.9) Unspecified Anxiety Disorder  Psychosocial & Contextual Factors: relationship conflicts, trauma history  WHODAS: 31    Referral / Collaboration:  Referral to another professional/service is not indicated at this time..    Anticipated number of session or this episode of care: 12-24      MeasurableTreatment Goal(s) related to diagnosis / functional impairment(s)  Goal 1: Client will keep self safe and eliminate suicidal ideation and self-harm behaviors.    Objective #A (Client Action)    Client will make a list of at least 10 skills or activities that you will to use to distract from urges to harm self.  Status: Completed - Date: 12/16/16     Intervention(s)  Therapist will provide ideas and strategies for generating coping skills list.    Objective #B  Client will make a list of pros and cons for tolerating and not tolerating an urge to harm self.  Status: Completed - Date: 12/16/16     Intervention(s)  Therapist will guide client through DBT-style Pros/Cons list for behavior  change.    Objective #C  Client will identify and practice the new strategies for dealing with strong emotions, learn and practice relaxation breathing.  Status: Continued - Date(s): 12/16/16     Intervention(s)  Therapist will teach distraction skills. Practice them within session and outside of session.    Goal 2: Client will report reduction in anxiety also as evidenced by reduction of BELL-7 score below 6 points within the next 12 weeks.    Objective #A (Client Action)    Client will identify at least three triggers for anxiety.  Status: Completed - Date: 12/16/16     Intervention(s)  Therapist will provide educational materials on common triggers and signs of anxiety.    Objective #B  Client will use relaxation strategies at least two times per day to reduce the physical symptoms of anxiety.  Status: Continued - Date(s): 12/16/16     Intervention(s)  Therapist will teach relaxation strategies such as mindfulness, deep breathing, muscle relaxation, and sensory activities.    Objective #C  Client will use cognitive strategies identified in therapy to challenge anxious thoughts.  Status: Continued - Date(s): 12/16/16     Intervention(s)  Therapist will teach strategies for cognitive modification using REBT model.    Goal 3: Client will report improved mood as indicated by PHQ-9 score below 6 for consistent 8 weeks.    Objective #A (Client Action)    Status: Continued- Date: 12/16/16    Client will Increase interest, engagement, and pleasure in doing things.    Intervention(s)  Therapist will assign homework for daily involvement in pleasant activities.    Objective #B  Client will Identify negative self-talk and behaviors: challenge core beliefs, myths, and actions.    Status: Continued - Date(s): 12/16/16     Intervention(s)  Therapist will teach strategies for cognitive modification using REBT models.    Objective #C  Client will Improve quantity and quality of night time sleep.  Status: Continued -  Date(s):12/16/16     Intervention(s)  Therapist will teach sleep hygiene strategies.  Assign homework for daily practice.    Goal 4: Client will report reduced trauma responses including decreased re-experiencing, decreased avoidance, and decreased hyperarousal.    Objective #A (Client Action)    Client will successful maintain focus during imageries such as Container, Calm Place, and Light Stream.  Status: New - Date: 2/3/17     Intervention(s)  Therapist will provide practice of imagery and mindfulness during session.  Assign homework for practice of the imagery outside of session.    Objective #B  Client will provide history of trauma or distressing events and how these impact the cognitions and feelings about self.    Status: New - Date: 2/3/17     Intervention(s)  Therapist will assist client in identifying how the past stressors are impacting currenting cognitive, behavioral, and emotional functioning.    Objective #C  Client will use bilateral stimulation while recalling distressing memories and focusing on new, helpful thoughts and feelings.  Status: New - Date: 2/3/17     Intervention(s)  Therapist will provide bilateral stimulation through touch or eye movement and elicit feedback from client about SUDs scale ratings.      Client has reviewed and agreed to the above plan.      Kendra Gilliland, LMFT  September 9, 2016

## 2017-02-17 NOTE — MR AVS SNAPSHOT
MRN:[id number]                      After Visit Summary   2/17/2017    Aimee M Welander    MRN: [id number]           Visit Information        Provider Department      2/17/2017 11:00 AM Kendra Gilliland [facility]      Your next 10 appointments already scheduled     Mar 03, 2017 10:00 AM CST   Return Visit with Kendra Gilliland [facility] [place] ([facility] [place])    [address]  [place] [address] [phone]            Mar 17, 2017 11:00 AM CDT   Return Visit with Kendra Gilliland [facility] [place] ([facility] [place])    [address]  [place] [address] [phone]            Mar 31, 2017 11:00 AM CDT   Return Visit with Kendra Gilliland [facility] [place] ([facility] [place])    [address]  [place] [address] [phone]            Apr 15, 2017 10:00 AM CDT   Return Visit with Kendra Gilliland FT   [facility] [place] ([facility] [place])    [address]  [place] [address] [phone]              MyChart Information     [employer] gives you secure access to your electronic health record. If you see a primary care provider, you can also send messages to your care team and make appointments. If you have questions, please call your primary care clinic.  If you do not have a primary care provider, please call [phone] and they will assist you.        Care EveryWhere ID     This is your Care EveryWhere ID. This could be used by other organizations to access your [place] medical records  [id number]

## 2017-03-03 ENCOUNTER — OFFICE VISIT (OUTPATIENT)
Dept: PSYCHOLOGY | Facility: CLINIC | Age: 32
End: 2017-03-03
Payer: COMMERCIAL

## 2017-03-03 DIAGNOSIS — F33.1 MAJOR DEPRESSIVE DISORDER, RECURRENT EPISODE, MODERATE (H): Primary | ICD-10-CM

## 2017-03-03 PROCEDURE — 90834 PSYTX W PT 45 MINUTES: CPT | Performed by: MARRIAGE & FAMILY THERAPIST

## 2017-03-03 ASSESSMENT — ANXIETY QUESTIONNAIRES
5. BEING SO RESTLESS THAT IT IS HARD TO SIT STILL: SEVERAL DAYS
7. FEELING AFRAID AS IF SOMETHING AWFUL MIGHT HAPPEN: NOT AT ALL
3. WORRYING TOO MUCH ABOUT DIFFERENT THINGS: SEVERAL DAYS
GAD7 TOTAL SCORE: 7
6. BECOMING EASILY ANNOYED OR IRRITABLE: SEVERAL DAYS
1. FEELING NERVOUS, ANXIOUS, OR ON EDGE: MORE THAN HALF THE DAYS
2. NOT BEING ABLE TO STOP OR CONTROL WORRYING: SEVERAL DAYS

## 2017-03-03 ASSESSMENT — PATIENT HEALTH QUESTIONNAIRE - PHQ9: 5. POOR APPETITE OR OVEREATING: SEVERAL DAYS

## 2017-03-03 NOTE — MR AVS SNAPSHOT
MRN:[id number]                      After Visit Summary   3/3/2017    Aimee M Welander    MRN: [id number]           Visit Information        Provider Department      3/3/2017 10:00 AM [facility]      Your next 10 appointments already scheduled     Mar 17, 2017 11:00 AM CDT   Return Visit with Kendra Gilliland [facility] [place] ([facility] [place])    [address]  [place] [address] [phone]            Mar 31, 2017 11:00 AM CDT   Return Visit with Kendra Gilliland [facility] [place] ([facility] [place])    [address]  [place] [address] [phone]            Apr 15, 2017 10:00 AM CDT   Return Visit with Kendra Gilliland [facility] [place] ([facility] [place])    [address]  [place] [address] [phone]            Apr 29, 2017 11:00 AM CDT   Return Visit with Kendra Gilliland FT   [facility] [place] ([facility] [place])    [address]  [place] [address] [phone]              [employer] Information     [facility] gives you secure access to your electronic health record. If you see a primary care provider, you can also send messages to your care team and make appointments. If you have questions, please call your primary care clinic.  If you do not have a primary care provider, please call [phone] and they will assist you.        Care EveryWhere ID     This is your Care EveryWhere ID. This could be used by other organizations to access your [place] medical records  [id number]

## 2017-03-04 ASSESSMENT — ANXIETY QUESTIONNAIRES: GAD7 TOTAL SCORE: 7

## 2017-03-04 ASSESSMENT — PATIENT HEALTH QUESTIONNAIRE - PHQ9: SUM OF ALL RESPONSES TO PHQ QUESTIONS 1-9: 3

## 2017-03-17 ENCOUNTER — OFFICE VISIT (OUTPATIENT)
Dept: PSYCHOLOGY | Facility: CLINIC | Age: 32
End: 2017-03-17
Payer: COMMERCIAL

## 2017-03-17 DIAGNOSIS — F33.1 MAJOR DEPRESSIVE DISORDER, RECURRENT EPISODE, MODERATE (H): Primary | ICD-10-CM

## 2017-03-17 PROCEDURE — 90834 PSYTX W PT 45 MINUTES: CPT | Performed by: MARRIAGE & FAMILY THERAPIST

## 2017-03-17 ASSESSMENT — PATIENT HEALTH QUESTIONNAIRE - PHQ9: 5. POOR APPETITE OR OVEREATING: MORE THAN HALF THE DAYS

## 2017-03-17 ASSESSMENT — ANXIETY QUESTIONNAIRES
3. WORRYING TOO MUCH ABOUT DIFFERENT THINGS: SEVERAL DAYS
6. BECOMING EASILY ANNOYED OR IRRITABLE: NOT AT ALL
7. FEELING AFRAID AS IF SOMETHING AWFUL MIGHT HAPPEN: NOT AT ALL
5. BEING SO RESTLESS THAT IT IS HARD TO SIT STILL: NOT AT ALL
2. NOT BEING ABLE TO STOP OR CONTROL WORRYING: SEVERAL DAYS
1. FEELING NERVOUS, ANXIOUS, OR ON EDGE: MORE THAN HALF THE DAYS
GAD7 TOTAL SCORE: 6

## 2017-03-17 NOTE — MR AVS SNAPSHOT
MRN:[id number]                      After Visit Summary   3/17/2017    Aimee M Welander    MRN: [id number]           Visit Information        Provider Department      3/17/2017 11:00 AM [facility]      Your next 10 appointments already scheduled     Mar 31, 2017 11:00 AM CDT   Return Visit with Kendra Gilliland [facility] [place] ([facility] [place])    [address]  [place] [address] [phone]            Apr 15, 2017 10:00 AM CDT   Return Visit with Kendra Gilliland [facility] [place] ([facility] [place])    [address]  [place] [address] [phone]            Apr 29, 2017 11:00 AM CDT   Return Visit with Kendra Gilliland [facility] [place] ([facility] [place])    [address]  [place] [address] [phone]            May 12, 2017 10:00 AM CDT   Return Visit with Kendra Gilliland FT   [facility] [place] ([facility] [place])    [address]  [place] [address] [phone]              [employer] Information     [employer] gives you secure access to your electronic health record. If you see a primary care provider, you can also send messages to your care team and make appointments. If you have questions, please call your primary care clinic.  If you do not have a primary care provider, please call [phone] and they will assist you.        Care EveryWhere ID     This is your Care EveryWhere ID. This could be used by other organizations to access your [place] medical records  [id number]

## 2017-03-17 NOTE — PROGRESS NOTES
"                                           Progress Note    Client Name: Aimee M Welander  Date: 3/17/17         Service Type: Individual      Session Start Time: 11:00  Session End Time: 11:48      Session Length: 38-52     Session #: 15     Attendees: Client    Treatment Plan Last Reviewed: n/a  PHQ-9 / BELL-7: completed  LOCUS / CASII: n/a     DATA      Progress Since Last Session (Related to Symptoms / Goals / Homework):   Symptoms: Stable    Homework: Achieved / completed to satisfaction      Episode of Care Goals: Satisfactory progress - ACTION (Actively working towards change); Intervened by reinforcing change plan / affirming steps taken     Current / Ongoing Stressors and Concerns:  Client completed a phase of reprocessing related to belief of \"I'm not worth showing up for.\"  Validity of new cognition (\"Other people do not determine my worth\") reached a 7 rating.  Client reports increased stressors about dog's health and she has  appointment this afternoon.      Treatment Objective(s) Addressed in This Session:   Client will use bilateral stimulation while recalling distressing memories and focusing on new, helpful thoughts and feelings.       Intervention:   EMDR: reprocessing with bilateral stimulation.          ASSESSMENT: Current Emotional / Mental Status (status of significant symptoms):   Risk status (Self / Other harm or suicidal ideation)    Client denies current fears or concerns for personal safety.  Client denies current or recent suicidal ideation or behaviors.   Client denies current or recent homicidal ideation or behaviors.  Client denies current or recent self injurious behavior or ideation.  Client denies other safety concerns.  Client reports there are no firearms in the house.  A safety and risk management plan has been developed.     Appearance:   Appropriate    Eye Contact:   Fair    Psychomotor Behavior: Normal    Attitude:   Cooperative "    Orientation:   All   Speech    Rate / Production: Normal     Volume:  Normal    Mood:    Anxious  Normal   Affect:    Appropriate    Thought Content:  Clear    Thought Form:  Coherent  Logical    Insight:    Fair      Medication Review:   No changes to current psychiatric medication(s)     Medication Compliance:   No client stopped all psychotropic medications     Changes in Health Issues:   None reported     Chemical Use Review:   Substance Use: Chemical use reviewed, no active concerns identified      Tobacco Use: Yes, decrease.  Client reports frequency of use daily with an e-cigarette. Provided support and affirmation for steps taken towards quiting      Collateral Reports Completed:   Not Applicable    PLAN: (Client Tasks / Therapist Tasks / Other)  1. Continue EMDR reprocessing    Kendra Gilliland, LMFT                                                        ________________________________________________________________________    Treatment Plan    Client's Name: Aimee M Welander  YOB: 1985    Date: 9/9/16    Diagnoses: 296.32 Major Depressive Disorder, Recurrent Episode, Moderate _  300.00 (F41.9) Unspecified Anxiety Disorder  Psychosocial & Contextual Factors: relationship conflicts, trauma history  WHODAS: 29    Referral / Collaboration:  Referral to another professional/service is not indicated at this time..    Anticipated number of session or this episode of care: 12-24      MeasurableTreatment Goal(s) related to diagnosis / functional impairment(s)  Goal 1: Client will keep self safe and eliminate suicidal ideation and self-harm behaviors.    Objective #A (Client Action)    Client will make a list of at least 10 skills or activities that you will to use to distract from urges to harm self.  Status: Completed - Date: 12/16/16     Intervention(s)  Therapist will provide ideas and strategies for generating coping skills list.    Objective #B  Client will make a list of pros and cons  for tolerating and not tolerating an urge to harm self.  Status: Completed - Date: 12/16/16     Intervention(s)  Therapist will guide client through DBT-style Pros/Cons list for behavior change.    Objective #C  Client will identify and practice the new strategies for dealing with strong emotions, learn and practice relaxation breathing.  Status: Continued - Date(s): 3/17/17     Intervention(s)  Therapist will teach distraction skills. Practice them within session and outside of session.    Goal 2: Client will report reduction in anxiety also as evidenced by reduction of BELL-7 score below 6 points within the next 12 weeks.    Objective #A (Client Action)    Client will identify at least three triggers for anxiety.  Status: Completed - Date: 12/16/16     Intervention(s)  Therapist will provide educational materials on common triggers and signs of anxiety.    Objective #B  Client will use relaxation strategies at least two times per day to reduce the physical symptoms of anxiety.  Status: Continued - Date(s): 3/17/17     Intervention(s)  Therapist will teach relaxation strategies such as mindfulness, deep breathing, muscle relaxation, and sensory activities.    Objective #C  Client will use cognitive strategies identified in therapy to challenge anxious thoughts.  Status: Continued - Date(s): 3/17/17     Intervention(s)  Therapist will teach strategies for cognitive modification using REBT model.    Goal 3: Client will report improved mood as indicated by PHQ-9 score below 6 for consistent 8 weeks.    Objective #A (Client Action)    Status: Continued- Date: 3/17/17    Client will Increase interest, engagement, and pleasure in doing things.    Intervention(s)  Therapist will assign homework for daily involvement in pleasant activities.    Objective #B  Client will Identify negative self-talk and behaviors: challenge core beliefs, myths, and actions.    Status: Continued - Date(s): 12/16/16      Intervention(s)  Therapist will teach strategies for cognitive modification using REBT models.    Objective #C  Client will Improve quantity and quality of night time sleep.  Status: Continued - Date(s):12/16/16     Intervention(s)  Therapist will teach sleep hygiene strategies.  Assign homework for daily practice.    Goal 4: Client will report reduced trauma responses including decreased re-experiencing, decreased avoidance, and decreased hyperarousal.    Objective #A (Client Action)    Client will successful maintain focus during imageries such as Container, Calm Place, and Light Stream.  Status: New - Date: 2/3/17     Intervention(s)  Therapist will provide practice of imagery and mindfulness during session.  Assign homework for practice of the imagery outside of session.    Objective #B  Client will provide history of trauma or distressing events and how these impact the cognitions and feelings about self.    Status: New - Date: 2/3/17     Intervention(s)  Therapist will assist client in identifying how the past stressors are impacting currenting cognitive, behavioral, and emotional functioning.    Objective #C  Client will use bilateral stimulation while recalling distressing memories and focusing on new, helpful thoughts and feelings.  Status: New - Date: 2/3/17     Intervention(s)  Therapist will provide bilateral stimulation through touch or eye movement and elicit feedback from client about SUDs scale ratings.      Client has reviewed and agreed to the above plan.      Kendra Gilliland, LMFT  September 9, 2016

## 2017-03-18 ASSESSMENT — ANXIETY QUESTIONNAIRES: GAD7 TOTAL SCORE: 6

## 2017-03-18 ASSESSMENT — PATIENT HEALTH QUESTIONNAIRE - PHQ9: SUM OF ALL RESPONSES TO PHQ QUESTIONS 1-9: 7

## 2017-03-31 ENCOUNTER — OFFICE VISIT (OUTPATIENT)
Dept: PSYCHOLOGY | Facility: CLINIC | Age: 32
End: 2017-03-31
Payer: COMMERCIAL

## 2017-03-31 DIAGNOSIS — F41.9 ANXIETY: Primary | ICD-10-CM

## 2017-03-31 PROCEDURE — 90834 PSYTX W PT 45 MINUTES: CPT | Performed by: MARRIAGE & FAMILY THERAPIST

## 2017-03-31 ASSESSMENT — PATIENT HEALTH QUESTIONNAIRE - PHQ9: 5. POOR APPETITE OR OVEREATING: NEARLY EVERY DAY

## 2017-03-31 ASSESSMENT — ANXIETY QUESTIONNAIRES
2. NOT BEING ABLE TO STOP OR CONTROL WORRYING: MORE THAN HALF THE DAYS
7. FEELING AFRAID AS IF SOMETHING AWFUL MIGHT HAPPEN: SEVERAL DAYS
5. BEING SO RESTLESS THAT IT IS HARD TO SIT STILL: MORE THAN HALF THE DAYS
GAD7 TOTAL SCORE: 14
1. FEELING NERVOUS, ANXIOUS, OR ON EDGE: NEARLY EVERY DAY
6. BECOMING EASILY ANNOYED OR IRRITABLE: SEVERAL DAYS
3. WORRYING TOO MUCH ABOUT DIFFERENT THINGS: MORE THAN HALF THE DAYS

## 2017-03-31 NOTE — PROGRESS NOTES
Progress Note    Client Name: Aimee M Welander  Date: 3/31/17         Service Type: Individual      Session Start Time: 11:00  Session End Time: 11:52      Session Length: 38-52     Session #: 16     Attendees: Client    Treatment Plan Last Reviewed: n/a  PHQ-9 / BELL-7: completed  LOCUS / CASII: n/a     DATA      Progress Since Last Session (Related to Symptoms / Goals / Homework):   Symptoms: Stable    Homework: Achieved / completed to satisfaction      Episode of Care Goals: Satisfactory progress - ACTION (Actively working towards change); Intervened by reinforcing change plan / affirming steps taken     Current / Ongoing Stressors and Concerns:  Client reports difficult week.  Her dog passed away in her bed during the overnight this past Sunday.  She feels some guilt about her frustration toward the dog during the overnight.  Client feels supported by her polyamorous group and is enjoying these connections.  She elected to postpone additional EMDR work today as she has been quite emotional this week.  She is looking forward to a busy, fun-filled weekend.      Treatment Objective(s) Addressed in This Session:   Situational: grief     Intervention:   CBT: cognitive modification, grief.          ASSESSMENT: Current Emotional / Mental Status (status of significant symptoms):   Risk status (Self / Other harm or suicidal ideation)    Client denies current fears or concerns for personal safety.  Client denies current or recent suicidal ideation or behaviors.   Client denies current or recent homicidal ideation or behaviors.  Client denies current or recent self injurious behavior or ideation.  Client denies other safety concerns.  Client reports there are no firearms in the house.  A safety and risk management plan has been developed.     Appearance:   Appropriate    Eye Contact:   Fair    Psychomotor Behavior: Normal    Attitude:   Cooperative     Orientation:   All   Speech    Rate / Production: Normal     Volume:  Normal    Mood:    Anxious  Normal   Affect:    Appropriate    Thought Content:  Clear    Thought Form:  Coherent  Logical    Insight:    Fair      Medication Review:   No changes to current psychiatric medication(s)     Medication Compliance:   No client stopped all psychotropic medications     Changes in Health Issues:   None reported     Chemical Use Review:   Substance Use: Chemical use reviewed, no active concerns identified      Tobacco Use: Yes, decrease.  Client reports frequency of use daily with an e-cigarette. Provided support and affirmation for steps taken towards quiting      Collateral Reports Completed:   Not Applicable    PLAN: (Client Tasks / Therapist Tasks / Other)  1. Continue EMDR reprocessing    Kendra Gilliland, LMFT                                                        ________________________________________________________________________    Treatment Plan    Client's Name: Aimee M Welander  YOB: 1985    Date: 9/9/16    Diagnoses: 296.32 Major Depressive Disorder, Recurrent Episode, Moderate _  300.00 (F41.9) Unspecified Anxiety Disorder  Psychosocial & Contextual Factors: relationship conflicts, trauma history  WHODAS: 29    Referral / Collaboration:  Referral to another professional/service is not indicated at this time..    Anticipated number of session or this episode of care: 12-24      MeasurableTreatment Goal(s) related to diagnosis / functional impairment(s)  Goal 1: Client will keep self safe and eliminate suicidal ideation and self-harm behaviors.    Objective #A (Client Action)    Client will make a list of at least 10 skills or activities that you will to use to distract from urges to harm self.  Status: Completed - Date: 12/16/16     Intervention(s)  Therapist will provide ideas and strategies for generating coping skills list.    Objective #B  Client will make a list of pros and cons  for tolerating and not tolerating an urge to harm self.  Status: Completed - Date: 12/16/16     Intervention(s)  Therapist will guide client through DBT-style Pros/Cons list for behavior change.    Objective #C  Client will identify and practice the new strategies for dealing with strong emotions, learn and practice relaxation breathing.  Status: Continued - Date(s): 3/17/17     Intervention(s)  Therapist will teach distraction skills. Practice them within session and outside of session.    Goal 2: Client will report reduction in anxiety also as evidenced by reduction of BELL-7 score below 6 points within the next 12 weeks.    Objective #A (Client Action)    Client will identify at least three triggers for anxiety.  Status: Completed - Date: 12/16/16     Intervention(s)  Therapist will provide educational materials on common triggers and signs of anxiety.    Objective #B  Client will use relaxation strategies at least two times per day to reduce the physical symptoms of anxiety.  Status: Continued - Date(s): 3/17/17     Intervention(s)  Therapist will teach relaxation strategies such as mindfulness, deep breathing, muscle relaxation, and sensory activities.    Objective #C  Client will use cognitive strategies identified in therapy to challenge anxious thoughts.  Status: Continued - Date(s): 3/17/17     Intervention(s)  Therapist will teach strategies for cognitive modification using REBT model.    Goal 3: Client will report improved mood as indicated by PHQ-9 score below 6 for consistent 8 weeks.    Objective #A (Client Action)    Status: Continued- Date: 3/17/17    Client will Increase interest, engagement, and pleasure in doing things.    Intervention(s)  Therapist will assign homework for daily involvement in pleasant activities.    Objective #B  Client will Identify negative self-talk and behaviors: challenge core beliefs, myths, and actions.    Status: Continued - Date(s): 12/16/16      Intervention(s)  Therapist will teach strategies for cognitive modification using REBT models.    Objective #C  Client will Improve quantity and quality of night time sleep.  Status: Continued - Date(s):12/16/16     Intervention(s)  Therapist will teach sleep hygiene strategies.  Assign homework for daily practice.    Goal 4: Client will report reduced trauma responses including decreased re-experiencing, decreased avoidance, and decreased hyperarousal.    Objective #A (Client Action)    Client will successful maintain focus during imageries such as Container, Calm Place, and Light Stream.  Status: New - Date: 2/3/17     Intervention(s)  Therapist will provide practice of imagery and mindfulness during session.  Assign homework for practice of the imagery outside of session.    Objective #B  Client will provide history of trauma or distressing events and how these impact the cognitions and feelings about self.    Status: New - Date: 2/3/17     Intervention(s)  Therapist will assist client in identifying how the past stressors are impacting currenting cognitive, behavioral, and emotional functioning.    Objective #C  Client will use bilateral stimulation while recalling distressing memories and focusing on new, helpful thoughts and feelings.  Status: New - Date: 2/3/17     Intervention(s)  Therapist will provide bilateral stimulation through touch or eye movement and elicit feedback from client about SUDs scale ratings.      Client has reviewed and agreed to the above plan.      Kendra Gilliland, LMFT  September 9, 2016

## 2017-03-31 NOTE — MR AVS SNAPSHOT
MRN:[id number]                      After Visit Summary   3/31/2017    Aimee M Welander    MRN: [id number]           Visit Information        Provider Department      3/31/2017 11:00 AM [facility]      Your next 10 appointments already scheduled     Apr 15, 2017 10:00 AM CDT   Return Visit with Kendra Gilliland [facility] [place] ([facility] [place])    [address]  [place] [address] [phone]            Apr 29, 2017 11:00 AM CDT   Return Visit with Kendra Gilliland [facility] [place] ([facility] [place])    [address]  [place] [address] [phone]            May 12, 2017 12:00 PM CDT   Return Visit with Kendra Gilliland [facility] [place] ([facility] [place])    [address]  [place] [address] [phone]            May 26, 2017 12:00 PM CDT   Return Visit with Kendra Gilliland FT   [facility] [place] ([facility] [place])    [address]  [place] [address] [phone]              [employer] Information     [facility] gives you secure access to your electronic health record. If you see a primary care provider, you can also send messages to your care team and make appointments. If you have questions, please call your primary care clinic.  If you do not have a primary care provider, please call [phone] and they will assist you.        Care EveryWhere ID     This is your Care EveryWhere ID. This could be used by other organizations to access your [place] medical records  [id number]

## 2017-04-01 ASSESSMENT — PATIENT HEALTH QUESTIONNAIRE - PHQ9: SUM OF ALL RESPONSES TO PHQ QUESTIONS 1-9: 17

## 2017-04-01 ASSESSMENT — ANXIETY QUESTIONNAIRES: GAD7 TOTAL SCORE: 14

## 2017-04-08 ENCOUNTER — MYC MEDICAL ADVICE (OUTPATIENT)
Dept: INTERNAL MEDICINE | Facility: CLINIC | Age: 32
End: 2017-04-08

## 2017-04-15 ENCOUNTER — OFFICE VISIT (OUTPATIENT)
Dept: PSYCHOLOGY | Facility: CLINIC | Age: 32
End: 2017-04-15
Payer: COMMERCIAL

## 2017-04-15 DIAGNOSIS — F41.9 ANXIETY: Primary | ICD-10-CM

## 2017-04-15 PROCEDURE — 90834 PSYTX W PT 45 MINUTES: CPT | Performed by: MARRIAGE & FAMILY THERAPIST

## 2017-04-15 NOTE — PROGRESS NOTES
"                                           Progress Note    Client Name: Aimee M Welander  Date: 4/15/17         Service Type: Individual      Session Start Time: 10:00  Session End Time: 10:49      Session Length: 38-52     Session #: 17     Attendees: Client    Treatment Plan Last Reviewed: n/a  PHQ-9 / BELL-7: not completed  LOCUS / CASII: n/a     DATA      Progress Since Last Session (Related to Symptoms / Goals / Homework):   Symptoms: Stable    Homework: Achieved / completed to satisfaction      Episode of Care Goals: Satisfactory progress - ACTION (Actively working towards change); Intervened by reinforcing change plan / affirming steps taken     Current / Ongoing Stressors and Concerns:  Client elected to return to EMDR today and completed a memory reprocessing related to belief of \"I'm not worth it.\"  She reported feeling relaxed and confident about her worth not being related to father's choice to not take her when he moved.       Treatment Objective(s) Addressed in This Session:   Reprocessing with bilateral stimulation     Intervention:   EMDR: reprocessing with bilateral stimulation.          ASSESSMENT: Current Emotional / Mental Status (status of significant symptoms):   Risk status (Self / Other harm or suicidal ideation)    Client denies current fears or concerns for personal safety.  Client denies current or recent suicidal ideation or behaviors.   Client denies current or recent homicidal ideation or behaviors.  Client denies current or recent self injurious behavior or ideation.  Client denies other safety concerns.  Client reports there are no firearms in the house.  A safety and risk management plan has been developed.     Appearance:   Appropriate    Eye Contact:   Fair    Psychomotor Behavior: Normal    Attitude:   Cooperative    Orientation:   All   Speech    Rate / Production: Normal     Volume:  Normal    Mood:    Anxious  Normal   Affect:    Appropriate    Thought Content:  Clear "    Thought Form:  Coherent  Logical    Insight:    Fair      Medication Review:   No changes to current psychiatric medication(s)     Medication Compliance:   No client stopped all psychotropic medications     Changes in Health Issues:   None reported     Chemical Use Review:   Substance Use: Chemical use reviewed, no active concerns identified      Tobacco Use: Yes, decrease.  Client reports frequency of use daily with an e-cigarette. Provided support and affirmation for steps taken towards quiting      Collateral Reports Completed:   Not Applicable    PLAN: (Client Tasks / Therapist Tasks / Other)  1. Continue EMDR reprocessing    Kendra MAXIMOElsa Gilliland, [facility]                                                        ________________________________________________________________________    Treatment Plan    Client's Name: Aimee M Welander  YOB: 1985    Date: 9/9/16    Diagnoses: 296.32 Major Depressive Disorder, Recurrent Episode, Moderate _  300.00 (F41.9) Unspecified Anxiety Disorder  Psychosocial & Contextual Factors: relationship conflicts, trauma history  WHODAS: 31    Referral / Collaboration:  Referral to another professional/service is not indicated at this time..    Anticipated number of session or this episode of care: 12-24      MeasurableTreatment Goal(s) related to diagnosis / functional impairment(s)  Goal 1: Client will keep self safe and eliminate suicidal ideation and self-harm behaviors.    Objective #A (Client Action)    Client will make a list of at least 10 skills or activities that you will to use to distract from urges to harm self.  Status: Completed - Date: 12/16/16     Intervention(s)  Therapist will provide ideas and strategies for generating coping skills list.    Objective #B  Client will make a list of pros and cons for tolerating and not tolerating an urge to harm self.  Status: Completed - Date: 12/16/16     Intervention(s)  Therapist will guide client through DBT-style  Pros/Cons list for behavior change.    Objective #C  Client will identify and practice the new strategies for dealing with strong emotions, learn and practice relaxation breathing.  Status: Continued - Date(s): 3/17/17     Intervention(s)  Therapist will teach distraction skills. Practice them within session and outside of session.    Goal 2: Client will report reduction in anxiety also as evidenced by reduction of BELL-7 score below 6 points within the next 12 weeks.    Objective #A (Client Action)    Client will identify at least three triggers for anxiety.  Status: Completed - Date: 12/16/16     Intervention(s)  Therapist will provide educational materials on common triggers and signs of anxiety.    Objective #B  Client will use relaxation strategies at least two times per day to reduce the physical symptoms of anxiety.  Status: Continued - Date(s): 3/17/17     Intervention(s)  Therapist will teach relaxation strategies such as mindfulness, deep breathing, muscle relaxation, and sensory activities.    Objective #C  Client will use cognitive strategies identified in therapy to challenge anxious thoughts.  Status: Continued - Date(s): 3/17/17     Intervention(s)  Therapist will teach strategies for cognitive modification using REBT model.    Goal 3: Client will report improved mood as indicated by PHQ-9 score below 6 for consistent 8 weeks.    Objective #A (Client Action)    Status: Continued- Date: 3/17/17    Client will Increase interest, engagement, and pleasure in doing things.    Intervention(s)  Therapist will assign homework for daily involvement in pleasant activities.    Objective #B  Client will Identify negative self-talk and behaviors: challenge core beliefs, myths, and actions.    Status: Continued - Date(s): 12/16/16     Intervention(s)  Therapist will teach strategies for cognitive modification using REBT models.    Objective #C  Client will Improve quantity and quality of night time sleep.  Status:  Continued - Date(s):12/16/16     Intervention(s)  Therapist will teach sleep hygiene strategies.  Assign homework for daily practice.    Goal 4: Client will report reduced trauma responses including decreased re-experiencing, decreased avoidance, and decreased hyperarousal.    Objective #A (Client Action)    Client will successful maintain focus during imageries such as Container, Calm Place, and Light Stream.  Status: New - Date: 2/3/17     Intervention(s)  Therapist will provide practice of imagery and mindfulness during session.  Assign homework for practice of the imagery outside of session.    Objective #B  Client will provide history of trauma or distressing events and how these impact the cognitions and feelings about self.    Status: New - Date: 2/3/17     Intervention(s)  Therapist will assist client in identifying how the past stressors are impacting currenting cognitive, behavioral, and emotional functioning.    Objective #C  Client will use bilateral stimulation while recalling distressing memories and focusing on new, helpful thoughts and feelings.  Status: New - Date: 2/3/17     Intervention(s)  Therapist will provide bilateral stimulation through touch or eye movement and elicit feedback from client about SUDs scale ratings.      Client has reviewed and agreed to the above plan.      Kendra Gilliland, LMFT  September 9, 2016

## 2017-04-15 NOTE — MR AVS SNAPSHOT
MRN:[id number]                      After Visit Summary   4/15/2017    Aimee M Welander    MRN: [id number]           Visit Information        Provider Department      4/15/2017 10:00 AM [facility]      Your next 10 appointments already scheduled     Apr 29, 2017 11:00 AM CDT   Return Visit with MECCA Navas   [facility] [place] ([facility] [place])    [address] [facility]  [place] [id number] [phone]            May 12, 2017 12:00 PM CDT   Return Visit with MECCA Navas   [facility] [place] ([facility] [place])    [address]   [place] [address] [phone]            May 26, 2017 12:00 PM CDT   Return Visit with MECCA Navas   [facility] [place] ([facility] [place])    [address] [facility] [address] [phone]              MyChart Information     [email or web address] gives you secure access to your electronic health record. If you see a primary care provider, you can also send messages to your care team and make appointments. If you have questions, please call your primary care clinic.  If you do not have a primary care provider, please call [phone] and they will assist you.        Care EveryWhere ID     This is your Care EveryWhere ID. This could be used by other organizations to access your [place] medical records  [id number]

## 2017-04-28 ENCOUNTER — OFFICE VISIT (OUTPATIENT)
Dept: FAMILY MEDICINE | Facility: CLINIC | Age: 32
End: 2017-04-28
Payer: COMMERCIAL

## 2017-04-28 VITALS
SYSTOLIC BLOOD PRESSURE: 111 MMHG | TEMPERATURE: 98.7 F | BODY MASS INDEX: 33.53 KG/M2 | DIASTOLIC BLOOD PRESSURE: 76 MMHG | HEART RATE: 80 BPM | OXYGEN SATURATION: 98 % | WEIGHT: 198.4 LBS

## 2017-04-28 DIAGNOSIS — G56.03 BILATERAL CARPAL TUNNEL SYNDROME: ICD-10-CM

## 2017-04-28 DIAGNOSIS — L29.2 VULVAR PRURITUS: ICD-10-CM

## 2017-04-28 DIAGNOSIS — G43.011 INTRACTABLE MIGRAINE WITHOUT AURA AND WITH STATUS MIGRAINOSUS: Primary | ICD-10-CM

## 2017-04-28 PROCEDURE — 99214 OFFICE O/P EST MOD 30 MIN: CPT | Performed by: NURSE PRACTITIONER

## 2017-04-28 RX ORDER — NAPROXEN 500 MG/1
500 TABLET ORAL 2 TIMES DAILY PRN
Qty: 30 TABLET | Refills: 0 | Status: SHIPPED | OUTPATIENT
Start: 2017-04-28 | End: 2017-05-31

## 2017-04-28 RX ORDER — SUMATRIPTAN 25 MG/1
25-50 TABLET, FILM COATED ORAL
Qty: 18 TABLET | Refills: 1 | Status: SHIPPED | OUTPATIENT
Start: 2017-04-28 | End: 2017-05-02

## 2017-04-28 RX ORDER — NYSTATIN AND TRIAMCINOLONE ACETONIDE 100000; 1 [USP'U]/G; MG/G
OINTMENT TOPICAL 2 TIMES DAILY
Qty: 30 G | Refills: 1 | Status: SHIPPED | OUTPATIENT
Start: 2017-04-28 | End: 2018-11-23

## 2017-04-28 NOTE — PROGRESS NOTES
SUBJECTIVE:                                                    Aimee M Welander is a 31 year old female who presents to clinic today for the following health issues:      Concern - migraines, dizziness, hands falling asleep     Onset: 1.5 week; hx of migraines as long as she can remember. Similar to previous, although in the past, didn't seem to last this long    Description:   Migraines-constant since , dizziness-comes and goes 1.5 weeks-random, hands falling asleep-when laying down; working on computer    Intensity: moderate    Progression of Symptoms:  same    Accompanying Signs & Symptoms:  Eyes seem heavy; no vision change, no weakness       Previous history of similar problem:   none    Precipitating factors:   Worsened by: none    Alleviating factors:  Improved by: none       Therapies Tried and outcome: caffeine, ibu, food, rest water, rest, exercise    PROBLEMS TO ADD: medication refill    Problem list and histories reviewed & adjusted, as indicated.  Additional history: as documented    Patient Active Problem List   Diagnosis     CARDIOVASCULAR SCREENING; LDL GOAL LESS THAN 160     Intermittent asthma     Anxiety     Hair loss     Hirsutism     TAI (obstructive sleep apnea)     Post-operative pain     Mild episode of recurrent major depressive disorder (H)     Major depressive disorder, recurrent episode, moderate (H)     Past Surgical History:   Procedure Laterality Date     ABDOMEN SURGERY  08 and 2015     and hysterectomy     DAVINCI HYSTERECTOMY TOTAL, SALPINGECTOMY BILATERAL N/A 2015    Procedure: DAVINCI HYSTERECTOMY TOTAL, SALPINGECTOMY BILATERAL;  Surgeon: Sanchez Cortes MD;  Location: [facility] OR      GYN SURGERY             Social History   Substance Use Topics     Smoking status: Former Smoker     Packs/day: 0.50     Years: 10.00     Types: Cigarettes     Quit date: 1/10/2011     Smokeless tobacco: Never Used     Alcohol use 0.0 oz/week     0 Standard  drinks or equivalent per week      Comment: on occassion     Family History   Problem Relation Age of Onset     Arthritis Mother      Breast Cancer Mother      Depression Mother      Anxiety Disorder Mother      MENTAL ILLNESS Mother      Substance Abuse Mother      Anesthesia Reaction Mother      DIABETES Father      Depression Father      DIABETES Paternal Grandfather      Depression Brother      Depression Sister      CANCER Paternal Uncle      leukemia     CANCER Other      cousin with leukemia     Neurologic Disorder Brother      Spina Bifida     Anxiety Disorder Sister      Other Cancer Maternal Aunt      skin         Current Outpatient Prescriptions   Medication Sig Dispense Refill     SUMAtriptan (IMITREX) 25 MG tablet Take 1-2 tablets (25-50 mg) by mouth at onset of headache for migraine May repeat in 2 hours. Max 8 tablets/24 hours. 18 tablet 1     naproxen (NAPROSYN) 500 MG tablet Take 1 tablet (500 mg) by mouth 2 times daily as needed for moderate pain 30 tablet 0     nystatin-triamcinolone (MYCOLOG) ointment Apply topically 2 times daily 30 g 1     albuterol (VENTOLIN HFA) 108 (90 BASE) MCG/ACT Inhaler Inhale 2 puffs into the lungs every 4 hours as needed for shortness of breath / dyspnea or wheezing 1 Inhaler 12     ALPRAZolam (XANAX) 0.5 MG tablet Take 1 tablet (0.5 mg) by mouth 3 times daily as needed for anxiety (#15 to last one month at least) 15 tablet 0     Allergies   Allergen Reactions     Amoxicillin Swelling     Throat swelling, trouble breathing.     Penicillins Swelling     Sulfa Drugs Nausea and Vomiting     BP Readings from Last 3 Encounters:   04/28/17 111/76   02/09/17 113/70   12/16/16 121/78    Wt Readings from Last 3 Encounters:   04/28/17 198 lb 6.4 oz (90 kg)   02/09/17 194 lb (88 kg)   12/16/16 193 lb 3.2 oz (87.6 kg)              Labs reviewed in EPIC    Reviewed and updated as needed this visit by clinical staff  Tobacco  Allergies  Meds  Med Hx  Surg Hx  Fam Hx  Soc Hx       Reviewed and updated as needed this visit by Provider         ROS:  Constitutional, HEENT, cardiovascular, pulmonary, GI, , musculoskeletal, neuro, skin, endocrine and psych systems are negative, except as otherwise noted.    OBJECTIVE:                                                    /76  Pulse 80  Temp 98.7  F (37.1  C) (Oral)  Wt 198 lb 6.4 oz (90 kg)  LMP 01/12/2015 (Approximate)  SpO2 98%  BMI 33.53 kg/m2  Body mass index is 33.53 kg/(m^2).  GENERAL: healthy, alert and no distress  EYES: Eyes grossly normal to inspection, PERRL and conjunctivae and sclerae normal  HENT: ear canals and TM's normal, nose and mouth without ulcers or lesions  NECK: no adenopathy, no asymmetry, masses, or scars and thyroid normal to palpation  RESP: lungs clear to auscultation - no rales, rhonchi or wheezes  CV: regular rate and rhythm, normal S1 S2, no S3 or S4, no murmur, click or rub, no peripheral edema and peripheral pulses strong  MS: no gross musculoskeletal defects noted, no edema POSITIVE for tingling/ falling asleep on ulner side on wrist- intermittent  SKIN: no suspicious lesions or rashes  NEURO: A&O, cranial nerves intact, normal strength and tone, mentation intact and speech normal    Diagnostic Test Results:  See orders     ASSESSMENT/PLAN:                                                    Migraine: addressed due to acute problem   Plan:  No changes in the patient's current treatment plan  See orders        ICD-10-CM    1. Intractable migraine without aura and with status migrainosus G43.011 SUMAtriptan (IMITREX) 25 MG tablet     naproxen (NAPROSYN) 500 MG tablet   2. Bilateral carpal tunnel syndrome G56.03    3. Vulvar pruritus L29.2 nystatin-triamcinolone (MYCOLOG) ointment       See Patient Instructions: discussed imaging of brain if medication does not resolve migraine. Had eye exam within last 1 year.     Naomy Rodriguez, BRENDA  [facility]

## 2017-04-28 NOTE — MR AVS SNAPSHOT
"              After Visit Summary   4/28/2017    Aimee M Welander    MRN: [id number]           Patient Information     Date Of Birth          1985        Visit Information        Provider Department      4/28/2017 10:00 AM Naomy Rodriguez NP [facility]        Today's Diagnoses     Bilateral carpal tunnel syndrome    -  1    Intractable migraine without aura and with status migrainosus        Vulvar pruritus          Care Instructions      Migraine Headache: Stages and Treatment  A migraine headache tends to progress in stages. Learning these stages can help you better understand what is happening. Then you can learn ways to reduce pain and relieve other symptoms. Methods for relieving your symptoms include self-care and medicines.  Migraine stages  Migraines tend to progress through 4 stages. Many people don't have all stages, and stages may differ with each headache:    Prodrome. A few hours to a day or so before the headache, you may feel tired, (yawning many times), uneasy, or celaya. You may also feel bloated or crave certain foods.    Aura. Up to an hour before the headache starts, some migraine sufferers experience aura--flashing lights, blind spots, other vision problems, confusion, difficulty speaking, or other neurologic symptoms.    Headache. Moderate to severe pain affects one side of the head and then can spread to both sides, often along with nausea. You may be highly sensitive to light, sound, and odors. Vomiting or diarrhea may also happen. This stage lasts 4 to 72 hours.    Postdrome. After your headache ends, you may feel tired, achy, and \"washed out.\" This may last for a day or so.    Self-care during a migraine  Here is what you can do:    Use a cold compress. Wrap a thin cloth around a cold pack, a cold can of soda, or a bag of frozen vegetables. Apply this to your temple or other pain site.    Drink fluids. If nausea makes it hard to drink, try sucking on ice.    Rest. If " possible, lie down. Try not to bend over, as this may increase your pain. Sometimes laying in a dark quiet room can help the migraine from being aggravated.      Try caffeine. Some people find that drinking fluids with caffeine, such as coffee or tea, helps to lessen migraine pain.  Using medicines  Work with your healthcare provider to find the right medicines for you. Medicines for migraine may relieve pain (analgesics), relieve nausea, or attack the migraine's root causes (migraine-specific medicines).  Rebound headache  Taking analgesics each day, or even several times a week, may lead to more frequent and severe headaches. These are called rebound headaches. If you think you're having rebound headaches, tell your healthcare provider. He or she can help you safely decrease your medicine. Rebound caffeine withdrawal headaches can also happen.      9165-4921 [employer]. [address] [place] [address], [place] [address]. All rights reserved. This information is not intended as a substitute for professional medical care. Always follow your healthcare professional's instructions.        Carpal Tunnel Syndrome    Carpal tunnel syndrome is a painful condition of the wrist and arm. It is caused by pressure on the median nerve.  The median nerve is one of the nerves that give feeling and movement to the hand. It passes through a tunnel in the wrist called the carpal tunnel. This tunnel is made up of bones and ligaments. Narrowing of this tunnel or swelling of the tissues inside the tunnel puts pressure on the median nerve. This causes numbness, pins and needles, or electric shooting pains in your hand and forearm. Often the pain is worse at night and may wake you when you are asleep.  Carpal tunnel syndrome may occur during pregnancy and with use of birth control pills. It is more common in workers who must often bend their wrists. It is also common in people who work with power tools that cause strong  vibrations.  Home care    Rest the painful wrist. Avoid repeated bending of the wrist back and forth. This puts pressure on the median nerve. Avoid using power tools with strong vibrations.    If you were given a splint, wear it at night while you sleep. You may also wear it during the day for comfort.    Move your fingers and wrists often to avoid stiffness.    Elevate your arms on pillows when you lie down.    Try using the unaffected hand more.    Try not to hold your wrists in a bent, downward position.    Sometimes changes in the work place may ease symptoms. If you type most of the day, it may help to change the position of your keyboard or add a wrist support. Your wrist should be in a neutral position and not bent back when typing.    You may use over-the-counter pain medicine to treat pain and inflammation, unless another medicine was prescribed. Anti-inflammatory pain medicines, such as ibuprofen or naproxen may be more effective than acetaminophen, which treats pain, but not inflammation. If you have chronic liver or kidney disease or ever had a stomach ulcer or GI bleeding, talk with your doctor before using these medicines.    Opioid pain medicine will only give temporary relief and does not treat the problem. If pain continues, you may need a shot of a steroid drug into your wrist.    If the above methods fail, you may need surgery. This will open the carpal tunnel and release the pressure on the trapped nerve.  Follow-up care  Follow up with your healthcare provider, or as advised, if the pain doesn t begin to improve within the next week.  If X-rays were taken, you will be notified of any new findings that may affect your care.  When to seek medical advice  Call your healthcare provider right away if any of these occur:    Pain not improving with the above treatment    Fingers or hand become cold, blue, numb, or tingly    Your whole arm becomes swollen or weak    1331-9517 [employer]  [address] [place] [address]. All rights reserved. This information is not intended as a substitute for professional medical care. Always follow your healthcare professional's instructions.        Carpal Tunnel Syndrome Prevention Tips  Some repetitive hand activities put you at higher risk for carpal tunnel syndrome (CTS). But you can reduce your risk. Learn how to change the way you use your hands. Below are tips for at home and on the job. Be sure to also follow the hand and wrist safety policies at your workplace.  Keep your wrist in neutral    Keep a neutral (straight) wrist position as often as you can. Don t use your wrist in a bent (flexed) position for long periods of time. This includes extended or twisted positions.  Watch your   Don t just use your thumb and index finger to grasp or lift. This can put stress on your wrist. When you can, use your whole hand and all its fingers to grasp an object.  Minimize repetition  Don t move your arms or hands or hold an object in the same way for long periods of time. Even simple, light tasks can cause injury this way. Instead, alternate tasks or switch hands.  Rest your hands  Give your hands a break from time to time with a rest. Even a few minutes once an hour can help.  Reduce speed and force  Slow down the speed in which you do a forceful, repetitive motion. This gives your wrist time to recover from the effort. Use power tools to help reduce the force.  Strengthen the muscles     Keep your wrist in a neutral (straight) position when exercising.     Weak muscles may lead to a poor wrist or arm position. Exercises will make your hand and arm muscles stronger. This can help you keep a better position.    2943-1874 [employer]. [address] [place] [address]. All rights reserved. This information is not intended as a substitute for professional medical care. Always follow your healthcare professional's  instructions.              Follow-ups after your visit        Follow-up notes from your care team     Return if symptoms worsen or fail to improve.      Your next 10 appointments already scheduled     Apr 29, 2017 11:00 AM CDT   Return Visit with Kendra Gilliland [facility]    [address] [facility] [id number] [phone]            May 12, 2017 12:00 PM CDT   Return Visit with Kendra Gilliland [facility]    [address] [facility] [id number] [phone]            May 26, 2017 12:00 PM CDT   Return Visit with Kendra Gilliland [facility]    [address] [facility] [address] [phone]              Who to contact     Normal or non-critical lab and imaging results will be communicated to you by VBOX[employer], letter or phone within 4 business days after the clinic has received the results. If you do not hear from us within 7 days, please contact the clinic through [employer] or phone. If you have a critical or abnormal lab result, we will notify you by phone as soon as possible.  Submit refill requests through [employer] or call your pharmacy and they will forward the refill request to us. Please allow 3 business days for your refill to be completed.          If you need to speak with a  for additional information , please call: [phone]             Additional Information About Your Visit        [employer] Information     [employer] gives you secure access to your electronic health record. If you see a primary care provider, you can also send messages to your care team and make appointments. If you have questions, please call your primary care clinic.  If you do not have a primary care provider, please call [phone] and they will assist you.        Care EveryWhere ID     This is your Care EveryWhere ID. This could be used by  other organizations to access your [facility] medical records  [id number]        Your Vitals Were     Pulse Temperature Last Period Pulse Oximetry BMI (Body Mass Index)       80 98.7  F (37.1  C) (Oral) 01/12/2015 (Approximate) 98% 33.53 kg/m2        Blood Pressure from Last 3 Encounters:   04/28/17 111/76   02/09/17 113/70   12/16/16 121/78    Weight from Last 3 Encounters:   04/28/17 198 lb 6.4 oz (90 kg)   02/09/17 194 lb (88 kg)   12/16/16 193 lb 3.2 oz (87.6 kg)              Today, you had the following     No orders found for display         Today's Medication Changes          These changes are accurate as of: 4/28/17 10:07 AM.  If you have any questions, ask your nurse or doctor.               Start taking these medicines.        Dose/Directions    naproxen 500 MG tablet   Commonly known as:  NAPROSYN   Used for:  Intractable migraine without aura and with status migrainosus   Started by:  Naomy Rodriguez NP        Dose:  500 mg   Take 1 tablet (500 mg) by mouth 2 times daily as needed for moderate pain   Quantity:  30 tablet   Refills:  0       SUMAtriptan 25 MG tablet   Commonly known as:  IMITREX   Used for:  Intractable migraine without aura and with status migrainosus   Started by:  Naomy Rodriguez NP        Dose:  25-50 mg   Take 1-2 tablets (25-50 mg) by mouth at onset of headache for migraine May repeat in 2 hours. Max 8 tablets/24 hours.   Quantity:  18 tablet   Refills:  1            Where to get your medicines      These medications [facility] to [id number] [place] - [address]  [address] [place] [address]     Phone:  [phone]     naproxen 500 MG tablet    nystatin-triamcinolone ointment    SUMAtriptan 25 MG tablet                Primary Care Provider Office Phone # Fax #    Corey Umanzor [phone]       [facility] [address] [facility] [address]        Thank you!     Thank you for choosing [facility] [place]  for  your care. Our goal is always to provide you with excellent care. Hearing back from our patients is one way we can continue to improve our services. Please take a few minutes to complete the written survey that you may receive in the mail after your visit with us. Thank you!             Your Updated Medication List - Protect others around you: Learn how to safely use, store and throw away your medicines at www.disposemymeds.org.          This list is accurate as of: 4/28/17 10:07 AM.  Always use your most recent med list.                   Brand Name Dispense Instructions for use    albuterol 108 (90 BASE) MCG/ACT Inhaler    VENTOLIN HFA    1 Inhaler    Inhale 2 puffs into the lungs every 4 hours as needed for shortness of breath / dyspnea or wheezing       ALPRAZolam 0.5 MG tablet    XANAX    15 tablet    Take 1 tablet (0.5 mg) by mouth 3 times daily as needed for anxiety (#15 to last one month at least)       naproxen 500 MG tablet    NAPROSYN    30 tablet    Take 1 tablet (500 mg) by mouth 2 times daily as needed for moderate pain       nystatin-triamcinolone ointment    MYCOLOG    30 g    Apply topically 2 times daily       SUMAtriptan 25 MG tablet    IMITREX    18 tablet    Take 1-2 tablets (25-50 mg) by mouth at onset of headache for migraine May repeat in 2 hours. Max 8 tablets/24 hours.

## 2017-04-28 NOTE — NURSING NOTE
"Chief Complaint   Patient presents with     Headache     Dizziness     Hand Problem       Initial /76  Pulse 80  Temp 98.7  F (37.1  C) (Oral)  Wt 198 lb 6.4 oz (90 kg)  LMP 01/12/2015 (Approximate)  SpO2 98%  BMI 33.53 kg/m2 Estimated body mass index is 33.53 kg/(m^2) as calculated from the following:    Height as of 2/9/17: 5' 4.5\" (1.638 m).    Weight as of this encounter: 198 lb 6.4 oz (90 kg).  Medication Reconciliation: complete     Marii Valdez MA  "

## 2017-04-28 NOTE — PATIENT INSTRUCTIONS
"  Migraine Headache: Stages and Treatment  A migraine headache tends to progress in stages. Learning these stages can help you better understand what is happening. Then you can learn ways to reduce pain and relieve other symptoms. Methods for relieving your symptoms include self-care and medicines.  Migraine stages  Migraines tend to progress through 4 stages. Many people don't have all stages, and stages may differ with each headache:    Prodrome. A few hours to a day or so before the headache, you may feel tired, (yawning many times), uneasy, or celaya. You may also feel bloated or crave certain foods.    Aura. Up to an hour before the headache starts, some migraine sufferers experience aura--flashing lights, blind spots, other vision problems, confusion, difficulty speaking, or other neurologic symptoms.    Headache. Moderate to severe pain affects one side of the head and then can spread to both sides, often along with nausea. You may be highly sensitive to light, sound, and odors. Vomiting or diarrhea may also happen. This stage lasts 4 to 72 hours.    Postdrome. After your headache ends, you may feel tired, achy, and \"washed out.\" This may last for a day or so.    Self-care during a migraine  Here is what you can do:    Use a cold compress. Wrap a thin cloth around a cold pack, a cold can of soda, or a bag of frozen vegetables. Apply this to your temple or other pain site.    Drink fluids. If nausea makes it hard to drink, try sucking on ice.    Rest. If possible, lie down. Try not to bend over, as this may increase your pain. Sometimes laying in a dark quiet room can help the migraine from being aggravated.      Try caffeine. Some people find that drinking fluids with caffeine, such as coffee or tea, helps to lessen migraine pain.  Using medicines  Work with your healthcare provider to find the right medicines for you. Medicines for migraine may relieve pain (analgesics), relieve nausea, or attack the " migraine's root causes (migraine-specific medicines).  Rebound headache  Taking analgesics each day, or even several times a week, may lead to more frequent and severe headaches. These are called rebound headaches. If you think you're having rebound headaches, tell your healthcare provider. He or she can help you safely decrease your medicine. Rebound caffeine withdrawal headaches can also happen.      0474-9157 [employer]. [address] [place] [address], [place] [address]. All rights reserved. This information is not intended as a substitute for professional medical care. Always follow your healthcare professional's instructions.        Carpal Tunnel Syndrome    Carpal tunnel syndrome is a painful condition of the wrist and arm. It is caused by pressure on the median nerve.  The median nerve is one of the nerves that give feeling and movement to the hand. It passes through a tunnel in the wrist called the carpal tunnel. This tunnel is made up of bones and ligaments. Narrowing of this tunnel or swelling of the tissues inside the tunnel puts pressure on the median nerve. This causes numbness, pins and needles, or electric shooting pains in your hand and forearm. Often the pain is worse at night and may wake you when you are asleep.  Carpal tunnel syndrome may occur during pregnancy and with use of birth control pills. It is more common in workers who must often bend their wrists. It is also common in people who work with power tools that cause strong vibrations.  Home care    Rest the painful wrist. Avoid repeated bending of the wrist back and forth. This puts pressure on the median nerve. Avoid using power tools with strong vibrations.    If you were given a splint, wear it at night while you sleep. You may also wear it during the day for comfort.    Move your fingers and wrists often to avoid stiffness.    Elevate your arms on pillows when you lie down.    Try using the unaffected hand more.    Try not to  hold your wrists in a bent, downward position.    Sometimes changes in the work place may ease symptoms. If you type most of the day, it may help to change the position of your keyboard or add a wrist support. Your wrist should be in a neutral position and not bent back when typing.    You may use over-the-counter pain medicine to treat pain and inflammation, unless another medicine was prescribed. Anti-inflammatory pain medicines, such as ibuprofen or naproxen may be more effective than acetaminophen, which treats pain, but not inflammation. If you have chronic liver or kidney disease or ever had a stomach ulcer or GI bleeding, talk with your doctor before using these medicines.    Opioid pain medicine will only give temporary relief and does not treat the problem. If pain continues, you may need a shot of a steroid drug into your wrist.    If the above methods fail, you may need surgery. This will open the carpal tunnel and release the pressure on the trapped nerve.  Follow-up care  Follow up with your healthcare provider, or as advised, if the pain doesn t begin to improve within the next week.  If X-rays were taken, you will be notified of any new findings that may affect your care.  When to seek medical advice  Call your healthcare provider right away if any of these occur:    Pain not improving with the above treatment    Fingers or hand become cold, blue, numb, or tingly    Your whole arm becomes swollen or weak    3846-3294 [employer]. [address] [place] [address]. All rights reserved. This information is not intended as a substitute for professional medical care. Always follow your healthcare professional's instructions.        Carpal Tunnel Syndrome Prevention Tips  Some repetitive hand activities put you at higher risk for carpal tunnel syndrome (CTS). But you can reduce your risk. Learn how to change the way you use your hands. Below are tips for at home and on the job. Be  sure to also follow the hand and wrist safety policies at your workplace.  Keep your wrist in neutral    Keep a neutral (straight) wrist position as often as you can. Don t use your wrist in a bent (flexed) position for long periods of time. This includes extended or twisted positions.  Watch your   Don t just use your thumb and index finger to grasp or lift. This can put stress on your wrist. When you can, use your whole hand and all its fingers to grasp an object.  Minimize repetition  Don t move your arms or hands or hold an object in the same way for long periods of time. Even simple, light tasks can cause injury this way. Instead, alternate tasks or switch hands.  Rest your hands  Give your hands a break from time to time with a rest. Even a few minutes once an hour can help.  Reduce speed and force  Slow down the speed in which you do a forceful, repetitive motion. This gives your wrist time to recover from the effort. Use power tools to help reduce the force.  Strengthen the muscles     Keep your wrist in a neutral (straight) position when exercising.     Weak muscles may lead to a poor wrist or arm position. Exercises will make your hand and arm muscles stronger. This can help you keep a better position.    8903-9851 [employer]. [address] [place] [address], [place] [address]. All rights reserved. This information is not intended as a substitute for professional medical care. Always follow your healthcare professional's instructions.

## 2017-04-29 ASSESSMENT — ASTHMA QUESTIONNAIRES: ACT_TOTALSCORE: 21

## 2017-05-01 ENCOUNTER — ALLIED HEALTH/NURSE VISIT (OUTPATIENT)
Dept: NURSING | Facility: CLINIC | Age: 32
End: 2017-05-01
Payer: COMMERCIAL

## 2017-05-01 DIAGNOSIS — G56.03 CARPAL TUNNEL SYNDROME, BILATERAL: Primary | ICD-10-CM

## 2017-05-01 PROCEDURE — 99207 ZZC NO CHARGE NURSE ONLY: CPT

## 2017-05-09 NOTE — PROGRESS NOTES
Answers for HPI/ROS submitted by the patient on 5/9/2017   Annual Exam:  Getting at least 3 servings of Calcium per day:: NO  Bi-annual eye exam:: Yes  Dental care twice a year:: NO  Sleep apnea or symptoms of sleep apnea:: None  Diet:: Regular (no restrictions)  Frequency of exercise:: None  Taking medications regularly:: No  Medication side effects:: Not applicable  Additional concerns today:: No  Q1: Little interest or pleasure in doing things: 1=Several days  Q2: Feeling down, depressed or hopeless: 1=Several days  PHQ-2 Score: 2  Barriers to taking medications:: Problems remembering to take them, Lost prescription, Side effects           SUBJECTIVE:     CC: Aimee M Welander is an 31 year old woman who presents for preventive health visit.     Healthy Habits:    Do you get at least three servings of calcium containing foods daily (dairy, green leafy vegetables, etc.)? No    Amount of exercise or daily activities, outside of work: 0 hour(s) per day    Problems taking medications regularly No    Medication side effects: No    Have you had an eye exam in the past two years? yes    Do you see a dentist twice per year? no    Do you have sleep apnea, excessive snoring or daytime drowsiness?no        S/p hysterectomy. Still has ovaries. No cervix.  No longer needs paps.   More dry skin, fatigue. More vaginal dryness. She would like thyroid and hormone levels checked as previously done by OBGYN. Is not fasting today, will return fasting.         Previously on zoloft-feeling much better.  Has xanax but uses very very sparingly. Not needing refill still has some from a long time ago. Denies suicidal or homicidal thoughts.  Patient instructed to go to the emergency room or call 911 if these occur.      Migraines-maxalt helps. Uses PRN.     Albuterol for mild asthma-uses very sparingly.     frederic-was mild, not needing cpap, was not recommended.     Obese.         Today's PHQ-2 Score:   PHQ-2 ( 1999 Pfizer) 5/9/2017 2/9/2017    Q1: Little interest or pleasure in doing things - 2   Q2: Feeling down, depressed or hopeless - 2   PHQ-2 Score - 4   Little interest or pleasure in doing things Several days -   Feeling down, depressed or hopeless Several days -   PHQ-2 Score 2 -       Abuse: Current or Past(Physical, Sexual or Emotional)- No  Do you feel safe in your environment - Yes    Social History   Substance Use Topics     Smoking status: Former Smoker     Packs/day: 0.50     Years: 10.00     Types: Cigarettes     Quit date: 1/10/2011     Smokeless tobacco: Never Used     Alcohol use 0.0 oz/week      Comment: on occassion     The patient does not drink >3 drinks per day nor >7 drinks per week.    Recent Labs   Lab Test  12   1416  11   0948   CHOL  156  137   HDL  33*  31*   LDL  102  90   TRIG  106  82   CHOLHDLRATIO  4.8  4.4       Reviewed orders with patient.  Reviewed health maintenance and updated orders accordingly - Yes    Mammo Decision Support:  Mammogram not appropriate for this patient based on age.    Pertinent mammograms are reviewed under the imaging tab.  History of abnormal Pap smear: YES - updated in Problem List and Health Maintenance accordingly    Reviewed and updated as needed this visit by clinical staff  Tobacco         Reviewed and updated as needed this visit by Provider        Past Medical History:   Diagnosis Date     Asthma      Depressive disorder      Endometriosis     with Adenolyosis     Low back pain      Migraines      Other chronic pain      PONV (postoperative nausea and vomiting)      Post partum depression       Past Surgical History:   Procedure Laterality Date     ABDOMEN SURGERY  08 and 2015     and hysterectomy     DAVINCI HYSTERECTOMY TOTAL, SALPINGECTOMY BILATERAL N/A 2015    Procedure: DAVINCI HYSTERECTOMY TOTAL, SALPINGECTOMY BILATERAL;  Surgeon: Sanchez Cortes MD;  Location: [facility] OR      GYN SURGERY           Obstetric History        T1      TAB0   SAB0   E0   M0   L1       # Outcome Date GA Lbr Hever/2nd Weight Sex Delivery Anes PTL Lv   1 Term 08 38w0d 04:00 8 lb 13 oz (3.997 kg) F -[facility] Spinal  Y          ROS:  C: NEGATIVE for fever, chills, change in weight  I: NEGATIVE for worrisome rashes, moles or lesions  E: NEGATIVE for vision changes or irritation  ENT: NEGATIVE for ear, mouth and throat problems  R: NEGATIVE for significant cough or SOB  B: NEGATIVE for masses, tenderness or discharge  CV: NEGATIVE for chest pain, palpitations or peripheral edema  GI: NEGATIVE for nausea, abdominal pain, heartburn, or change in bowel habits  : NEGATIVE for unusual urinary or vaginal symptoms. No vaginal bleeding.  M: NEGATIVE for significant arthralgias or myalgia  N: NEGATIVE for weakness, dizziness or paresthesias  P: NEGATIVE for changes in mood or affect     Problem list, Medication list, Allergies, and Medical/Social/Surgical histories reviewed in [facility] and updated as appropriate.  OBJECTIVE:     /73  Pulse 88  Temp 98.8  F (37.1  C) (Oral)  Wt 196 lb (88.9 kg)  LMP 2015 (Approximate)  SpO2 99%  Breastfeeding? No  BMI 33.12 kg/m2  EXAM:  GENERAL: healthy, alert and no distress  EYES: Eyes grossly normal to inspection, PERRL and conjunctivae and sclerae normal  HENT: ear canals and TM's normal, nose and mouth without ulcers or lesions  NECK: no adenopathy, no asymmetry, masses, or scars and thyroid normal to palpation  RESP: lungs clear to auscultation - no rales, rhonchi or wheezes  BREAST: normal without masses, tenderness or nipple discharge and no palpable axillary masses or adenopathy  CV: regular rate and rhythm, normal S1 S2, no S3 or S4, no murmur, click or rub, no peripheral edema and peripheral pulses strong  ABDOMEN: soft, nontender, no hepatosplenomegaly, no masses and bowel sounds normal  MS: no gross musculoskeletal defects noted, no edema  SKIN: no suspicious lesions or rashes  NEURO: Normal  "strength and tone, mentation intact and speech normal  PSYCH: mentation appears normal, affect normal/bright    ASSESSMENT/PLAN:     1. Encounter for routine adult health examination with abnormal findings      2. TAI (obstructive sleep apnea)      3. Dry skin    - Follicle stimulating hormone  - Estradiol  - TSH with free T4 reflex    Patient would like hormone testing  If normal, recheck 1 year      4. Intermittent asthma, uncomplicated      5. Screening for diabetes mellitus    - Comprehensive metabolic panel; Future    6. Lipid screening    - Lipid panel reflex to direct LDL; Future    7. Screening examination for venereal disease    - Anti Treponema  - Hepatitis B surface antigen  - Hepatitis C antibody  - HIV Antigen Antibody Combo  - Herpes Simplex Virus 1 and 2 IgG  - Neisseria gonorrhoeae PCR; Future  - Chlamydia trachomatis PCR; Future    8. BMI 33.0-33.9,adult        COUNSELING:   Reviewed preventive health counseling, as reflected in patient instructions       Regular exercise       Healthy diet/nutrition       Vision screening         reports that she quit smoking about 6 years ago. Her smoking use included Cigarettes. She has a 5.00 pack-year smoking history. She has never used smokeless tobacco.    Estimated body mass index is 33.12 kg/(m^2) as calculated from the following:    Height as of 2/9/17: 5' 4.5\" (1.638 m).    Weight as of this encounter: 196 lb (88.9 kg).   Weight management plan: Discussed healthy diet and exercise guidelines and patient will follow up in 12 months in clinic to re-evaluate.    Counseling Resources:  ATP IV Guidelines  Pooled Cohorts Equation Calculator  Breast Cancer Risk Calculator  FRAX Risk Assessment  ICSI Preventive Guidelines  Dietary Guidelines for Americans, 2010  USDA's MyPlate  ASA Prophylaxis  Lung CA Screening    Lindy Bingham PA-C  [facility]  "

## 2017-05-12 ENCOUNTER — OFFICE VISIT (OUTPATIENT)
Dept: PSYCHOLOGY | Facility: CLINIC | Age: 32
End: 2017-05-12
Payer: COMMERCIAL

## 2017-05-12 ENCOUNTER — OFFICE VISIT (OUTPATIENT)
Dept: FAMILY MEDICINE | Facility: CLINIC | Age: 32
End: 2017-05-12
Payer: COMMERCIAL

## 2017-05-12 VITALS
HEART RATE: 88 BPM | TEMPERATURE: 98.8 F | DIASTOLIC BLOOD PRESSURE: 73 MMHG | BODY MASS INDEX: 33.12 KG/M2 | WEIGHT: 196 LBS | OXYGEN SATURATION: 99 % | SYSTOLIC BLOOD PRESSURE: 105 MMHG

## 2017-05-12 DIAGNOSIS — Z13.220 LIPID SCREENING: ICD-10-CM

## 2017-05-12 DIAGNOSIS — G47.33 OSA (OBSTRUCTIVE SLEEP APNEA): ICD-10-CM

## 2017-05-12 DIAGNOSIS — L85.3 DRY SKIN: ICD-10-CM

## 2017-05-12 DIAGNOSIS — Z11.3 SCREENING EXAMINATION FOR VENEREAL DISEASE: ICD-10-CM

## 2017-05-12 DIAGNOSIS — Z00.01 ENCOUNTER FOR ROUTINE ADULT HEALTH EXAMINATION WITH ABNORMAL FINDINGS: Primary | ICD-10-CM

## 2017-05-12 DIAGNOSIS — Z13.1 SCREENING FOR DIABETES MELLITUS: ICD-10-CM

## 2017-05-12 DIAGNOSIS — F41.9 ANXIETY: Primary | ICD-10-CM

## 2017-05-12 DIAGNOSIS — J45.20 INTERMITTENT ASTHMA, UNCOMPLICATED: ICD-10-CM

## 2017-05-12 LAB
ESTRADIOL SERPL-MCNC: 115 PG/ML
FSH SERPL-ACNC: 2.2 IU/L
TSH SERPL DL<=0.005 MIU/L-ACNC: 2.3 MU/L (ref 0.4–4)

## 2017-05-12 PROCEDURE — 90834 PSYTX W PT 45 MINUTES: CPT | Performed by: MARRIAGE & FAMILY THERAPIST

## 2017-05-12 PROCEDURE — 86780 TREPONEMA PALLIDUM: CPT | Performed by: PHYSICIAN ASSISTANT

## 2017-05-12 PROCEDURE — 86803 HEPATITIS C AB TEST: CPT | Performed by: PHYSICIAN ASSISTANT

## 2017-05-12 PROCEDURE — 82670 ASSAY OF TOTAL ESTRADIOL: CPT | Performed by: PHYSICIAN ASSISTANT

## 2017-05-12 PROCEDURE — 84443 ASSAY THYROID STIM HORMONE: CPT | Performed by: PHYSICIAN ASSISTANT

## 2017-05-12 PROCEDURE — 86695 HERPES SIMPLEX TYPE 1 TEST: CPT | Performed by: PHYSICIAN ASSISTANT

## 2017-05-12 PROCEDURE — 87389 HIV-1 AG W/HIV-1&-2 AB AG IA: CPT | Performed by: PHYSICIAN ASSISTANT

## 2017-05-12 PROCEDURE — 87591 N.GONORRHOEAE DNA AMP PROB: CPT | Performed by: PHYSICIAN ASSISTANT

## 2017-05-12 PROCEDURE — 86696 HERPES SIMPLEX TYPE 2 TEST: CPT | Performed by: PHYSICIAN ASSISTANT

## 2017-05-12 PROCEDURE — 99395 PREV VISIT EST AGE 18-39: CPT | Performed by: PHYSICIAN ASSISTANT

## 2017-05-12 PROCEDURE — 87491 CHLMYD TRACH DNA AMP PROBE: CPT | Performed by: PHYSICIAN ASSISTANT

## 2017-05-12 PROCEDURE — 83001 ASSAY OF GONADOTROPIN (FSH): CPT | Performed by: PHYSICIAN ASSISTANT

## 2017-05-12 PROCEDURE — 87340 HEPATITIS B SURFACE AG IA: CPT | Performed by: PHYSICIAN ASSISTANT

## 2017-05-12 PROCEDURE — 36415 COLL VENOUS BLD VENIPUNCTURE: CPT | Performed by: PHYSICIAN ASSISTANT

## 2017-05-12 ASSESSMENT — ANXIETY QUESTIONNAIRES
2. NOT BEING ABLE TO STOP OR CONTROL WORRYING: SEVERAL DAYS
5. BEING SO RESTLESS THAT IT IS HARD TO SIT STILL: SEVERAL DAYS
GAD7 TOTAL SCORE: 6
3. WORRYING TOO MUCH ABOUT DIFFERENT THINGS: SEVERAL DAYS
7. FEELING AFRAID AS IF SOMETHING AWFUL MIGHT HAPPEN: NOT AT ALL
6. BECOMING EASILY ANNOYED OR IRRITABLE: SEVERAL DAYS
1. FEELING NERVOUS, ANXIOUS, OR ON EDGE: SEVERAL DAYS

## 2017-05-12 ASSESSMENT — PATIENT HEALTH QUESTIONNAIRE - PHQ9: 5. POOR APPETITE OR OVEREATING: SEVERAL DAYS

## 2017-05-12 NOTE — MR AVS SNAPSHOT
After Visit Summary   5/12/2017    Aimee M Welander    MRN: [id number]           Patient Information     Date Of Birth          1985        Visit Information        Provider Department      5/12/2017 10:20 AM Lindy Bingham PA-C [facility]        Today's Diagnoses     Encounter for routine adult health examination with abnormal findings    -  1    TAI (obstructive sleep apnea)        Dry skin        Intermittent asthma, uncomplicated        Screening for diabetes mellitus        Lipid screening        Screening examination for venereal disease        BMI 33.0-33.9,adult          Care Instructions      Preventive Health Recommendations  Female Ages 26 - 39  Yearly exam:   See your health care provider every year in order to    Review health changes.     Discuss preventive care.      Review your medicines if you your doctor has prescribed any.    Until age 30: Get a Pap test every three years (more often if you have had an abnormal result).    After age 30: Talk to your doctor about whether you should have a Pap test every 3 years or have a Pap test with HPV screening every 5 years.   You do not need a Pap test if your uterus was removed (hysterectomy) and you have not had cancer.  You should be tested each year for STDs (sexually transmitted diseases), if you're at risk.   Talk to your provider about how often to have your cholesterol checked.  If you are at risk for diabetes, you should have a diabetes test (fasting glucose).  Shots: Get a flu shot each year. Get a tetanus shot every 10 years.   Nutrition:     Eat at least 5 servings of fruits and vegetables each day.    Eat whole-grain bread, whole-wheat pasta and brown rice instead of white grains and rice.    Talk to your provider about Calcium and Vitamin D.     Lifestyle    Exercise at least 150 minutes a week (30 minutes a day, 5 days of the week). This will help you control your weight and prevent disease.    Limit  alcohol to one drink per day.    No smoking.     Wear sunscreen to prevent skin cancer.    See your dentist every six months for an exam and cleaning.          Follow-ups after your visit        Your next 10 appointments already scheduled     May 12, 2017 12:00 PM CDT   Return Visit with MECCA Navas   [facility] ([facility])    [address] [facility] [id number] [phone]            May 15, 2017  6:20 PM CDT   [facility] Sports Medicine New with Saundra Crump MD   [facility] [place] ([facility] [place])    [address]  [place] [address] [phone]            May 26, 2017 12:00 PM CDT   Return Visit with MECCA Navas   [facility] ([facility])    [address] [facility] [address] [phone]              Future tests that were ordered for you today     Open Future Orders        Priority Expected Expires Ordered    Lipid panel reflex to direct LDL Routine  5/12/2018 5/12/2017    Comprehensive metabolic panel Routine  5/12/2018 5/12/2017            Who to contact     If you have questions or need follow up information about today's clinic visit or your schedule please contact [facility] directly at [phone].  Normal or non-critical lab and imaging results will be communicated to you by MTailorhart, letter or phone within 4 business days after the clinic has received the results. If you do not hear from us within 7 days, please contact the clinic through [employer] or phone. If you have a critical or abnormal lab result, we will notify you by phone as soon as possible.  Submit refill requests through [facility] or call your pharmacy and they will forward the refill request to us. Please allow 3 business days for your refill to be completed.          Additional Information About Your Visit        [employer] Information     [employer] gives you secure  access to your electronic health record. If you see a primary care provider, you can also send messages to your care team and make appointments. If you have questions, please call your primary care clinic.  If you do not have a primary care provider, please call [phone] and they will assist you.        Care EveryWhere ID     This is your Care EveryWhere ID. This could be used by other organizations to access your [facility] medical records  [id number]        Your Vitals Were     Pulse Temperature Last Period Pulse Oximetry Breastfeeding? BMI (Body Mass Index)    88 98.8  F (37.1  C) (Oral) 01/12/2015 (Approximate) 99% No 33.12 kg/m2       Blood Pressure from Last 3 Encounters:   05/12/17 105/73   04/28/17 111/76   02/09/17 113/70    Weight from Last 3 Encounters:   05/12/17 196 lb (88.9 kg)   04/28/17 198 lb 6.4 oz (90 kg)   02/09/17 194 lb (88 kg)              We Performed the Following     Anti Treponema     Chlamydia trachomatis PCR     Estradiol     Follicle stimulating hormone     Hepatitis B surface antigen     Hepatitis C antibody     Herpes Simplex Virus 1 and 2 IgG     HIV Antigen Antibody Combo     Neisseria gonorrhoeae PCR     TSH with free T4 reflex        Primary Care Provider Office Phone # Fax #    Corey Umanzor [phone]       [facility] [address] [facility] [address]        Thank you!     Thank you for choosing [facility]  for your care. Our goal is always to provide you with excellent care. Hearing back from our patients is one way we can continue to improve our services. Please take a few minutes to complete the written survey that you may receive in the mail after your visit with us. Thank you!             Your Updated Medication List - Protect others around you: Learn how to safely use, store and throw away your medicines at www.disposemymeds.org.          This list is accurate as of: 5/12/17 11:14 AM.  Always use your most  recent med list.                   Brand Name Dispense Instructions for use    albuterol 108 (90 BASE) MCG/ACT Inhaler    VENTOLIN HFA    1 Inhaler    Inhale 2 puffs into the lungs every 4 hours as needed for shortness of breath / dyspnea or wheezing       ALPRAZolam 0.5 MG tablet    XANAX    15 tablet    Take 1 tablet (0.5 mg) by mouth 3 times daily as needed for anxiety (#15 to last one month at least)       naproxen 500 MG tablet    NAPROSYN    30 tablet    Take 1 tablet (500 mg) by mouth 2 times daily as needed for moderate pain       nystatin-triamcinolone ointment    MYCOLOG    30 g    Apply topically 2 times daily       rizatriptan 5 MG tablet    MAXALT    18 tablet    Take 1-2 tablets (5-10 mg) by mouth at onset of headache for migraine - May repeat dosing in 2 hours. Max 6 tablets/24 hours.

## 2017-05-12 NOTE — NURSING NOTE
"Chief Complaint   Patient presents with     Physical     AFE, Pt is not fasting and did not have labs done      Pre Visit Planning - Done       Initial /73  Pulse 88  Temp 98.8  F (37.1  C) (Oral)  Wt 196 lb (88.9 kg)  LMP 01/12/2015 (Approximate)  SpO2 99%  Breastfeeding? No  BMI 33.12 kg/m2 Estimated body mass index is 33.12 kg/(m^2) as calculated from the following:    Height as of 2/9/17: 5' 4.5\" (1.638 m).    Weight as of this encounter: 196 lb (88.9 kg).  Medication Reconciliation: complete      Cate Esposito MA    "

## 2017-05-12 NOTE — MR AVS SNAPSHOT
MRN:[id number]                      After Visit Summary   5/12/2017    Aimee M Welander    MRN: [id number]           Visit Information        Provider Department      5/12/2017 12:00 PM Kendra Gilliland, LMFT [facility]      Your next 10 appointments already scheduled     May 12, 2017  2:45 PM CDT   LAB with AN LAB   [facility] ([facility])    [address] [facility] [address] [phone]           Patient must bring picture ID.  Patient should be prepared to give a urine specimen  Please do not eat 10-12 hours before your appointment if you are coming in fasting for labs on lipids, cholesterol, or glucose (sugar).  Pregnant women should follow their Care Team instructions. Water with medications is okay. Do not drink coffee or other fluids.   If you have concerns about taking  your medications, please ask at office or if scheduling via [facility], send a message by clicking on Secure Messaging, Message Your Care Team.            May 15, 2017  6:20 PM CDT   [facility] Sports Medicine New with Saundra Crump MD   [facility] [place] ([facility] [place])    [address]  [place] [address] [phone]            May 26, 2017 12:00 PM CDT   Return Visit with MECCA Navas   [facility] ([facility])    [address] [facility] [address] [phone]              MyChart Information     [employer] gives you secure access to your electronic health record. If you see a primary care provider, you can also send messages to your care team and make appointments. If you have questions, please call your primary care clinic.  If you do not have a primary care provider, please call [phone] and they will assist you.        Care EveryWhere ID     This is your Care EveryWhere ID. This could be used by other organizations to access  your [facility] [place] medical records  [id number]

## 2017-05-12 NOTE — PROGRESS NOTES
Progress Note    Client Name: Aimee M Welander  Date: 5/12/17         Service Type: Individual      Session Start Time: 12:00  Session End Time: 12:52      Session Length: 38-52     Session #: 18     Attendees: Client    Treatment Plan Last Reviewed: n/a  PHQ-9 / BELL-7: completed  LOCUS / CASII: n/a     DATA      Progress Since Last Session (Related to Symptoms / Goals / Homework):   Symptoms: Stable    Homework: Achieved / completed to satisfaction      Episode of Care Goals: Satisfactory progress - ACTION (Actively working towards change); Intervened by reinforcing change plan / affirming steps taken     Current / Ongoing Stressors and Concerns:  Client reports a lot of changes recently and she has started a new polyamorous relationship so she now has three male partners.  She is finding it surprising to feel cared about in all three relationships as this has not been typical in previous relationships.  She is trying to balance her time in order to have time with daughter and also time for herself.     Treatment Objective(s) Addressed in This Session:   Situational     Intervention:   CBT: discussing recent events, balancing and mindfulness.          ASSESSMENT: Current Emotional / Mental Status (status of significant symptoms):   Risk status (Self / Other harm or suicidal ideation)    Client denies current fears or concerns for personal safety.  Client denies current or recent suicidal ideation or behaviors.   Client denies current or recent homicidal ideation or behaviors.  Client denies current or recent self injurious behavior or ideation.  Client denies other safety concerns.  Client reports there are no firearms in the house.  A safety and risk management plan has been developed.     Appearance:   Appropriate    Eye Contact:   Fair    Psychomotor Behavior: Normal    Attitude:   Cooperative    Orientation:   All   Speech    Rate / Production: Normal      Volume:  Normal    Mood:    Anxious  Normal   Affect:    Appropriate    Thought Content:  Clear    Thought Form:  Coherent  Logical    Insight:    Fair      Medication Review:   No changes to current psychiatric medication(s)     Medication Compliance:   No client stopped all psychotropic medications     Changes in Health Issues:   None reported     Chemical Use Review:   Substance Use: Chemical use reviewed, no active concerns identified      Tobacco Use: Yes, decrease.  Client reports frequency of use daily with an e-cigarette. Provided support and affirmation for steps taken towards quiting      Collateral Reports Completed:   Not Applicable    PLAN: (Client Tasks / Therapist Tasks / Other)  1. Continue EMDR reprocessing    Kendra Palmerriman, LMFT                                                        ________________________________________________________________________    Treatment Plan    Client's Name: Aimee M Welander  YOB: 1985    Date: 9/9/16    Diagnoses: 296.32 Major Depressive Disorder, Recurrent Episode, Moderate _  300.00 (F41.9) Unspecified Anxiety Disorder  Psychosocial & Contextual Factors: relationship conflicts, trauma history  WHODAS: 29    Referral / Collaboration:  Referral to another professional/service is not indicated at this time..    Anticipated number of session or this episode of care: 12-24      MeasurableTreatment Goal(s) related to diagnosis / functional impairment(s)  Goal 1: Client will keep self safe and eliminate suicidal ideation and self-harm behaviors.    Objective #A (Client Action)    Client will make a list of at least 10 skills or activities that you will to use to distract from urges to harm self.  Status: Completed - Date: 12/16/16     Intervention(s)  Therapist will provide ideas and strategies for generating coping skills list.    Objective #B  Client will make a list of pros and cons for tolerating and not tolerating an urge to harm  self.  Status: Completed - Date: 12/16/16     Intervention(s)  Therapist will guide client through DBT-style Pros/Cons list for behavior change.    Objective #C  Client will identify and practice the new strategies for dealing with strong emotions, learn and practice relaxation breathing.  Status: Continued - Date(s): 3/17/17     Intervention(s)  Therapist will teach distraction skills. Practice them within session and outside of session.    Goal 2: Client will report reduction in anxiety also as evidenced by reduction of BELL-7 score below 6 points within the next 12 weeks.    Objective #A (Client Action)    Client will identify at least three triggers for anxiety.  Status: Completed - Date: 12/16/16     Intervention(s)  Therapist will provide educational materials on common triggers and signs of anxiety.    Objective #B  Client will use relaxation strategies at least two times per day to reduce the physical symptoms of anxiety.  Status: Continued - Date(s): 3/17/17     Intervention(s)  Therapist will teach relaxation strategies such as mindfulness, deep breathing, muscle relaxation, and sensory activities.    Objective #C  Client will use cognitive strategies identified in therapy to challenge anxious thoughts.  Status: Continued - Date(s): 3/17/17     Intervention(s)  Therapist will teach strategies for cognitive modification using REBT model.    Goal 3: Client will report improved mood as indicated by PHQ-9 score below 6 for consistent 8 weeks.    Objective #A (Client Action)    Status: Continued- Date: 3/17/17    Client will Increase interest, engagement, and pleasure in doing things.    Intervention(s)  Therapist will assign homework for daily involvement in pleasant activities.    Objective #B  Client will Identify negative self-talk and behaviors: challenge core beliefs, myths, and actions.    Status: Continued - Date(s): 12/16/16     Intervention(s)  Therapist will teach strategies for cognitive modification  using REBT models.    Objective #C  Client will Improve quantity and quality of night time sleep.  Status: Continued - Date(s):12/16/16     Intervention(s)  Therapist will teach sleep hygiene strategies.  Assign homework for daily practice.    Goal 4: Client will report reduced trauma responses including decreased re-experiencing, decreased avoidance, and decreased hyperarousal.    Objective #A (Client Action)    Client will successful maintain focus during imageries such as Container, Calm Place, and Light Stream.  Status: New - Date: 2/3/17     Intervention(s)  Therapist will provide practice of imagery and mindfulness during session.  Assign homework for practice of the imagery outside of session.    Objective #B  Client will provide history of trauma or distressing events and how these impact the cognitions and feelings about self.    Status: New - Date: 2/3/17     Intervention(s)  Therapist will assist client in identifying how the past stressors are impacting currenting cognitive, behavioral, and emotional functioning.    Objective #C  Client will use bilateral stimulation while recalling distressing memories and focusing on new, helpful thoughts and feelings.  Status: New - Date: 2/3/17     Intervention(s)  Therapist will provide bilateral stimulation through touch or eye movement and elicit feedback from client about SUDs scale ratings.      Client has reviewed and agreed to the above plan.      Kendra Gilliland, LMFT  September 9, 2016

## 2017-05-13 LAB — T PALLIDUM IGG+IGM SER QL: NEGATIVE

## 2017-05-13 ASSESSMENT — PATIENT HEALTH QUESTIONNAIRE - PHQ9: SUM OF ALL RESPONSES TO PHQ QUESTIONS 1-9: 6

## 2017-05-13 ASSESSMENT — ANXIETY QUESTIONNAIRES: GAD7 TOTAL SCORE: 6

## 2017-05-14 LAB
C TRACH DNA SPEC QL NAA+PROBE: NORMAL
N GONORRHOEA DNA SPEC QL NAA+PROBE: NORMAL
SPECIMEN SOURCE: NORMAL
SPECIMEN SOURCE: NORMAL

## 2017-05-14 NOTE — PROGRESS NOTES
Marvin Bills,       Your recent test results are attached, if you have any questions or concerns please feel free to contact me via e-mail or call [phone]. STD testing negative. No chlamydia or gonorrhea. Always use condoms to prevent STDs. Re-check in one year or after changing sexual partners.          It was a pleasure to see you at your recent office visit.      Sincerely,  Lindy Bingham PA-C

## 2017-05-15 LAB
HBV SURFACE AG SERPL QL IA: NONREACTIVE
HCV AB SERPL QL IA: NORMAL
HIV 1+2 AB+HIV1 P24 AG SERPL QL IA: NORMAL
HSV1 IGG SERPL QL IA: NORMAL AI (ref 0–0.8)
HSV2 IGG SERPL QL IA: NORMAL AI (ref 0–0.8)

## 2017-05-17 NOTE — PROGRESS NOTES
Marvin Bills,       Your recent test results are attached, if you have any questions or concerns please feel free to contact me via e-mail or call [phone].  Testing is normal/negative.   All STD testing is negative/normal. No herpes.  No hepatitis B, hepatitis C, HIV, syphilis, chlamydia or gonorrhea.  Always use condoms to prevent the spread of STDS.  Re-test after every new partner or possible exposure.   Female hormones normal. Thyroid test normal.        It was a pleasure to see you at your recent office visit.      Sincerely,  Lindy Bingham PA-C

## 2017-05-26 ENCOUNTER — OFFICE VISIT (OUTPATIENT)
Dept: PSYCHOLOGY | Facility: CLINIC | Age: 32
End: 2017-05-26
Payer: COMMERCIAL

## 2017-05-26 DIAGNOSIS — F41.9 ANXIETY: Primary | ICD-10-CM

## 2017-05-26 PROCEDURE — 90834 PSYTX W PT 45 MINUTES: CPT | Performed by: MARRIAGE & FAMILY THERAPIST

## 2017-05-26 ASSESSMENT — ANXIETY QUESTIONNAIRES
6. BECOMING EASILY ANNOYED OR IRRITABLE: SEVERAL DAYS
2. NOT BEING ABLE TO STOP OR CONTROL WORRYING: SEVERAL DAYS
5. BEING SO RESTLESS THAT IT IS HARD TO SIT STILL: MORE THAN HALF THE DAYS
3. WORRYING TOO MUCH ABOUT DIFFERENT THINGS: SEVERAL DAYS
1. FEELING NERVOUS, ANXIOUS, OR ON EDGE: SEVERAL DAYS
GAD7 TOTAL SCORE: 10
7. FEELING AFRAID AS IF SOMETHING AWFUL MIGHT HAPPEN: MORE THAN HALF THE DAYS

## 2017-05-26 ASSESSMENT — PATIENT HEALTH QUESTIONNAIRE - PHQ9: 5. POOR APPETITE OR OVEREATING: MORE THAN HALF THE DAYS

## 2017-05-26 NOTE — MR AVS SNAPSHOT
MRN:[id number]                      After Visit Summary   5/26/2017    Aimee M Welander    MRN: [id number]           Visit Information        Provider Department      5/26/2017 12:00 PM Kendra Gilliland LMFT [facility]      MyChart Information     MyChart gives you secure access to your electronic health record. If you see a primary care provider, you can also send messages to your care team and make appointments. If you have questions, please call your primary care clinic.  If you do not have a primary care provider, please call [phone] and they will assist you.        Care EveryWhere ID     This is your Care EveryWhere ID. This could be used by other organizations to access your [facility] medical records  [id number]

## 2017-05-26 NOTE — PROGRESS NOTES
"                       Discharge Summary  Multiple Sessions    Client Name: Aimee M Welander MRN#: [id number] YOB: 1985    Discharge Date:   May 26, 2017      Service Type: Individual      Session Start Time: 12:00  Session End Time: 12:52      Session Length: 38-52     Session #: 19     Attendees: Client    Focus of Treatment Objective(s):  Client's presenting concerns included: anxiety and depression.  She reports \"I get in a loop\" of crying for a night.  She believes this is triggered by feeling or perceiving being rejected by someone.  Client reports that she feels anxious when home alone at night.  When anxiety rises, she feels a heightened sense of worthlessness.  She feels anxious in social situations and has to \"force myself to go.\"  She notices sensitivity in arguments and when she overhears others arguing.  She becomes highly anxious when thinking about being assertive with someone.   Stage of Change at time of Discharge: ACTION (Actively working towards change)    Medication Adherence:  Yes    Chemical Use:  Yes    Assessment: Current Emotional / Mental Status (status of significant symptoms):    Risk status (Self / Other harm or suicidal ideation)  Client denies current fears or concerns for personal safety.  Client denies current or recent suicidal ideation or behaviors.  Client denies current or recent homicidal ideation or behaviors.  Client denies current or recent self injurious behavior or ideation.  Client denies other safety concerns.  A safety and risk management plan has not been developed at this time, however client was given the after-hours number should there be a change in any of these risk factors.    Appearance:   Appropriate   Eye Contact:   Fair   Psychomotor Behavior: Normal   Attitude:   Cooperative   Orientation:   All  Speech   Rate / Production: Normal    Volume:  Normal   Mood:    Normal  Affect:    Worrisome   Thought Content:  Clear   Thought Form:  Coherent  " Logical   Insight:   Good     DSM5 Diagnoses: (Sustained by DSM5 Criteria Listed Above)  Diagnoses: 296.32 Major Depressive Disorder, Recurrent Episode, Moderate _  300.00 (F41.9) Unspecified Anxiety Disorder  Psychosocial & Contextual Factors: relationship conflicts, trauma history  WHODAS: 23    Reason for Discharge:  Client is satisfied with progress      Aftercare Plan:  Client may resume counseling services at any time in the future by calling the [facility] Intake Office, [phone].      Kendra Gilliland LMFT

## 2017-05-27 ASSESSMENT — ANXIETY QUESTIONNAIRES: GAD7 TOTAL SCORE: 10

## 2017-05-27 ASSESSMENT — PATIENT HEALTH QUESTIONNAIRE - PHQ9: SUM OF ALL RESPONSES TO PHQ QUESTIONS 1-9: 7

## 2017-05-31 DIAGNOSIS — G43.011 INTRACTABLE MIGRAINE WITHOUT AURA AND WITH STATUS MIGRAINOSUS: ICD-10-CM

## 2017-05-31 NOTE — TELEPHONE ENCOUNTER
Naproxen      Last Written Prescription Date: 04/28/17  Last Quantity: 30, # refills: 0  Last Office Visit with [facility], [facility] or [facility] prescribing provider: 04/28/17       Creatinine   Date Value Ref Range Status   04/10/2013 0.61 0.52 - 1.04 mg/dL Final     Lab Results   Component Value Date    AST 63 04/10/2013     Lab Results   Component Value Date    ALT 71 04/10/2013     BP Readings from Last 3 Encounters:   05/12/17 105/73   04/28/17 111/76   02/09/17 113/70

## 2017-06-01 RX ORDER — NAPROXEN 500 MG/1
TABLET ORAL
Qty: 30 TABLET | Refills: 5 | Status: SHIPPED | OUTPATIENT
Start: 2017-06-01 | End: 2018-11-23

## 2017-06-01 NOTE — TELEPHONE ENCOUNTER
Routing refill request to provider for review/approval because:  Patient was seen 1 time at , will route to home clinic for pcp to address.  Nancie Naidu RN

## 2017-07-10 ENCOUNTER — OFFICE VISIT (OUTPATIENT)
Dept: FAMILY MEDICINE | Facility: CLINIC | Age: 32
End: 2017-07-10
Payer: MEDICAID

## 2017-07-10 VITALS
HEART RATE: 81 BPM | TEMPERATURE: 97.8 F | HEIGHT: 64 IN | DIASTOLIC BLOOD PRESSURE: 77 MMHG | BODY MASS INDEX: 33.63 KG/M2 | WEIGHT: 197 LBS | SYSTOLIC BLOOD PRESSURE: 113 MMHG | OXYGEN SATURATION: 99 %

## 2017-07-10 DIAGNOSIS — F39 MOOD DISORDER (H): Primary | ICD-10-CM

## 2017-07-10 PROCEDURE — 99214 OFFICE O/P EST MOD 30 MIN: CPT | Performed by: PHYSICIAN ASSISTANT

## 2017-07-10 RX ORDER — TOPIRAMATE 25 MG/1
TABLET, FILM COATED ORAL
Qty: 70 TABLET | Refills: 0 | Status: SHIPPED | OUTPATIENT
Start: 2017-07-10 | End: 2017-12-20

## 2017-07-10 ASSESSMENT — ANXIETY QUESTIONNAIRES
2. NOT BEING ABLE TO STOP OR CONTROL WORRYING: MORE THAN HALF THE DAYS
7. FEELING AFRAID AS IF SOMETHING AWFUL MIGHT HAPPEN: NOT AT ALL
GAD7 TOTAL SCORE: 11
5. BEING SO RESTLESS THAT IT IS HARD TO SIT STILL: SEVERAL DAYS
IF YOU CHECKED OFF ANY PROBLEMS ON THIS QUESTIONNAIRE, HOW DIFFICULT HAVE THESE PROBLEMS MADE IT FOR YOU TO DO YOUR WORK, TAKE CARE OF THINGS AT HOME, OR GET ALONG WITH OTHER PEOPLE: SOMEWHAT DIFFICULT
1. FEELING NERVOUS, ANXIOUS, OR ON EDGE: MORE THAN HALF THE DAYS
3. WORRYING TOO MUCH ABOUT DIFFERENT THINGS: MORE THAN HALF THE DAYS
6. BECOMING EASILY ANNOYED OR IRRITABLE: MORE THAN HALF THE DAYS

## 2017-07-10 ASSESSMENT — PATIENT HEALTH QUESTIONNAIRE - PHQ9: 5. POOR APPETITE OR OVEREATING: MORE THAN HALF THE DAYS

## 2017-07-10 NOTE — NURSING NOTE
"Chief Complaint   Patient presents with     Depression     med check       Initial /77  Pulse 81  Temp 97.8  F (36.6  C) (Oral)  Ht 5' 4\" (1.626 m)  Wt 197 lb (89.4 kg)  LMP 01/12/2015 (Approximate)  SpO2 99%  Breastfeeding? No  BMI 33.81 kg/m2 Estimated body mass index is 33.81 kg/(m^2) as calculated from the following:    Height as of this encounter: 5' 4\" (1.626 m).    Weight as of this encounter: 197 lb (89.4 kg).  Medication Reconciliation: complete      Cate Esposito MA    "

## 2017-07-10 NOTE — PATIENT INSTRUCTIONS
Re-check in clinic in 3-4 weeks.  Since I will not be available at that time I recommend seeing my colleague Dr. Dumont for this. At that time if you are doing well I would add on a medication similar to zoloft to help with anxiety/depression     schedule with psychiatrist likely they will be months out    Call with side effects or concerns.     If medication makes you feel worse, stop right away and be seen.     If suicidal thoughts or plan occur, call 911 or go to emergency room.     monitor for fatigue, dizziness, severe change in appetite (decreased), or other side effects

## 2017-07-10 NOTE — MR AVS SNAPSHOT
After Visit Summary   7/10/2017    Aimee M Welander    MRN: [id number]           Patient Information     Date Of Birth          1985        Visit Information        Provider Department      7/10/2017 4:20 PM Lindy Bingham PA-C [facility]        Today's Diagnoses     Mood disorder (H)    -  1      Care Instructions    Re-check in clinic in 3-4 weeks.  Since I will not be available at that time I recommend seeing my colleague Dr. Dumont for this. At that time if you are doing well I would add on a medication similar to zoloft to help with anxiety/depression     schedule with psychiatrist likely they will be months out    Call with side effects or concerns.     If medication makes you feel worse, stop right away and be seen.     If suicidal thoughts or plan occur, call 911 or go to emergency room.     monitor for fatigue, dizziness, severe change in appetite (decreased), or other side effects                Follow-ups after your visit        Additional Services     MENTAL HEALTH REFERRAL       Your provider has referred you to: Behavioral Healthcare Providers Intake Scheduling [phone]  [email or web address]  Psychiatry please    All scheduling is subject to the client's specific insurance plan & benefits, provider/location availability, and provider clinical specialities.  Please arrive 15 minutes early for your first appointment and bring your completed paperwork.    Please be aware that coverage of these services is subject to the terms and limitations of your health insurance plan.  Call member services at your health plan with any benefit or coverage questions.                  Your next 10 appointments already scheduled     Jul 14, 2017  8:45 AM CDT   Lab visit with AN LAB   [facility] ([facility]    [address] [facility] [address] [phone]           Please do not eat 10-12 hours before your appointment if you are  "coming in fasting for labs on lipids, cholesterol, or glucose (sugar). Does not apply to pregnant women.  Water with medications is okay. Do not drink coffee or other fluids.  If you have concerns about taking  your medications, please send a message by clicking on Secure Messaging, Message Your Care Team.              Future tests that were ordered for you today     Open Future Orders        Priority Expected Expires Ordered    CBC with platelets differential Routine  7/10/2018 7/10/2017            Who to contact     If you have questions or need follow up information about today's clinic visit or your schedule please contact [facility] directly at [phone].  Normal or non-critical lab and imaging results will be communicated to you by Saqinahart, letter or phone within 4 business days after the clinic has received the results. If you do not hear from us within 7 days, please contact the clinic through Raftert or phone. If you have a critical or abnormal lab result, we will notify you by phone as soon as possible.  Submit refill requests through sones or call your pharmacy and they will forward the refill request to us. Please allow 3 business days for your refill to be completed.          Additional Information About Your Visit        sones Information     sones gives you secure access to your electronic health record. If you see a primary care provider, you can also send messages to your care team and make appointments. If you have questions, please call your primary care clinic.  If you do not have a primary care provider, please call [phone] and they will assist you.        Care EveryWhere ID     This is your Care EveryWhere ID. This could be used by other organizations to access your [facility] medical records  [id number]        Your Vitals Were     Pulse Temperature Height Last Period Pulse Oximetry Breastfeeding?    81 97.8  F (36.6  C) (Oral) 5' 4\" (1.626 m) 01/12/2015 " (Approximate) 99% No    BMI (Body Mass Index)                   33.81 kg/m2            Blood Pressure from Last 3 Encounters:   07/10/17 113/77   05/12/17 105/73   04/28/17 111/76    Weight from Last 3 Encounters:   07/10/17 197 lb (89.4 kg)   05/12/17 196 lb (88.9 kg)   04/28/17 198 lb 6.4 oz (90 kg)              We Performed the Following     DEPRESSION ACTION PLAN (DAP)     MENTAL HEALTH REFERRAL          Today's Medication Changes          These changes are accurate as of: 7/10/17  5:06 PM.  If you have any questions, ask your nurse or doctor.               Start taking these medicines.        Dose/Directions    topiramate 25 MG tablet   Commonly known as:  TOPAMAX   Used for:  Mood disorder (H)   Started by:  Lindy Bingham PA-C        Take 1 tablet (25 mg) at bedtime for 1 week, then 1 tablet twice daily for 1 week, then 1 tablet in AM and 2 in PM for 1 week, then 2 tablets twice daily.   Quantity:  70 tablet   Refills:  0            Where to get your medicines      Some of these will need a paper prescription and others can be bought over the counter.  Ask your nurse if you have questions.     Bring a paper prescription for each of these medications     topiramate 25 MG tablet                Primary Care Provider Office Phone # Fax #    Corey Alfredo Umanzor [phone]       [facility] [address] [facility] [address]        Equal Access to Services     JUSTIN RAGSDALE AH: Hadii stevo ku hadasho Soomaali, waaxda luqadaha, qaybta kaalmada adeegyada, waxay talat hayduran york. So [facility] [phone].    ATENCIÓN: Si habla español, tiene a gonzales disposición servicios gratuitos de asistencia lingüística. Llame al [phone].    We comply with applicable federal civil rights laws and [place] laws. We do not discriminate on the basis of race, color, national origin, age, disability sex, sexual orientation or gender identity.            Thank you!     Thank you  for choosing [facility]  for your [facility]. Our goal is always to provide you with excellent care. Hearing back from our patients is one way we can continue to improve our services. Please take a few minutes to complete the written survey that you may receive in the mail after your visit with us. Thank you!             Your Updated Medication List - Protect others around you: Learn how to safely use, store and throw away your medicines at www.disposemymeds.org.          This list is accurate as of: 7/10/17  5:06 PM.  Always use your most recent med list.                   Brand Name Dispense Instructions for use Diagnosis    albuterol 108 (90 BASE) MCG/ACT Inhaler    VENTOLIN HFA    1 Inhaler    Inhale 2 puffs into the lungs every 4 hours as needed for shortness of breath / dyspnea or wheezing    Intermittent asthma, uncomplicated       ALPRAZolam 0.5 MG tablet    XANAX    15 tablet    Take 1 tablet (0.5 mg) by mouth 3 times daily as needed for anxiety (#15 to last one month at least)    Anxiety       naproxen 500 MG tablet    NAPROSYN    30 tablet    TAKE 1 TABLET (500 MG) BY MOUTH 2 TIMES DAILY AS NEEDED FOR MODERATE PAIN    Intractable migraine without aura and with status migrainosus       nystatin-triamcinolone ointment    MYCOLOG    30 g    Apply topically 2 times daily    Vulvar pruritus       rizatriptan 5 MG tablet    MAXALT    18 tablet    Take 1-2 tablets (5-10 mg) by mouth at onset of headache for migraine - May repeat dosing in 2 hours. Max 6 tablets/24 hours.    Intractable migraine without aura and with status migrainosus       topiramate 25 MG tablet    TOPAMAX    70 tablet    Take 1 tablet (25 mg) at bedtime for 1 week, then 1 tablet twice daily for 1 week, then 1 tablet in AM and 2 in PM for 1 week, then 2 tablets twice daily.    Mood disorder (H)

## 2017-07-10 NOTE — PROGRESS NOTES
SUBJECTIVE:                                                    Aimee M Welander is a 31 year old female who presents to clinic today for the following health issues:    Depression and Anxiety Follow-Up    Status since last visit: No change    Other associated symptoms:hard time focusing, mood swings, sleeping issues, easily irritable, and fatigue    Complicating factors:     Significant life event: No     Current substance abuse: None    PHQ-9 SCORE 3/31/2017 5/12/2017 5/26/2017   Total Score MyChart - - -   Total Score 17 6 7     BELL-7 SCORE 3/31/2017 5/12/2017 5/26/2017   Total Score - - -   Total Score 14 6 10       PHQ-9  English  PHQ-9   Any Language  GAD7    Amount of exercise or physical activity: None    Problems taking medications regularly: No    Medication side effects: none    Diet: regular (no restrictions)    worsening anxiety, depression and mood changes. Is not currently on SSRI or similar medication.  Does have some nights where she feels she needs less sleep, no known history of bipolar.   Previously on zoloft. Did not help symptoms.  prozac made her suicidal when younger, did not work.  Has tried wellbutrin--stopped ? Side effects.     Xanax-used for anxiety PRN. [place] often[place]. tried lexapro in 2009.    Has never had medications managed through psychiatrist.   Has tried wellbutrin--stopped ? Side effects.     Will be having labs on Friday including CBC and CMP.     Has been seeing our therapist Kendra aguiar-Will be continuing with Raya for therapy at her new location also.     Denies drug use/abuse.     Denies suicidal or homicidal thoughts.  Patient instructed to go to the emergency room or call 911 if these occur.      Problem list and histories reviewed & adjusted, as indicated.  Additional history: as documented    Patient Active Problem List   Diagnosis     Anxiety     Hair loss     Hirsutism     TAI (obstructive sleep apnea)     Post-operative pain      Intermittent asthma, uncomplicated     BMI 33.0-33.9,adult     Past Surgical History:   Procedure Laterality Date     ABDOMEN SURGERY  08 and 2015     and hysterectomy     DAVINCI HYSTERECTOMY TOTAL, SALPINGECTOMY BILATERAL N/A 2015    Procedure: DAVINCI HYSTERECTOMY TOTAL, SALPINGECTOMY BILATERAL;  Surgeon: Sanchez Cortes MD;  Location: [facility] OR      GYN SURGERY             Social History   Substance Use Topics     Smoking status: Current Some Day Smoker     Packs/day: 0.50     Years: 10.00     Types: Cigarettes     Last attempt to quit: 1/10/2011     Smokeless tobacco: Never Used     Alcohol use 0.0 oz/week      Comment: on occassion     Family History   Problem Relation Age of Onset     Arthritis Mother      Breast Cancer Mother      Depression Mother      Anxiety Disorder Mother      MENTAL ILLNESS Mother      Substance Abuse Mother      Anesthesia Reaction Mother      DIABETES Father      Depression Father      DIABETES Paternal Grandfather      OSTEOPOROSIS Maternal Grandmother      Depression Brother      Depression Sister      CANCER Paternal Uncle      leukemia     CANCER Other      cousin with leukemia     Neurologic Disorder Brother      Spina Bifida     Anxiety Disorder Sister      Other Cancer Maternal Aunt      skin     Anxiety Disorder Daughter      Anesthesia Reaction Sister          Current Outpatient Prescriptions   Medication Sig Dispense Refill     topiramate (TOPAMAX) 25 MG tablet Take 1 tablet (25 mg) at bedtime for 1 week, then 1 tablet twice daily for 1 week, then 1 tablet in AM and 2 in PM for 1 week, then 2 tablets twice daily. 70 tablet 0     naproxen (NAPROSYN) 500 MG tablet TAKE 1 TABLET (500 MG) BY MOUTH 2 TIMES DAILY AS NEEDED FOR MODERATE PAIN 30 tablet 5     rizatriptan (MAXALT) 5 MG tablet Take 1-2 tablets (5-10 mg) by mouth at onset of headache for migraine - May repeat dosing in 2 hours. Max 6 tablets/24 hours. 18 tablet 1      "nystatin-triamcinolone (MYCOLOG) ointment Apply topically 2 times daily 30 g 1     albuterol (VENTOLIN HFA) 108 (90 BASE) MCG/ACT Inhaler Inhale 2 puffs into the lungs every 4 hours as needed for shortness of breath / dyspnea or wheezing 1 Inhaler 12     ALPRAZolam (XANAX) 0.5 MG tablet Take 1 tablet (0.5 mg) by mouth 3 times daily as needed for anxiety (#15 to last one month at least) (Patient not taking: Reported on 7/10/2017) 15 tablet 0     Allergies   Allergen Reactions     Amoxicillin Swelling     Throat swelling, trouble breathing.     Penicillins Swelling     Sulfa Drugs Nausea and Vomiting       ROS:  Constitutional, HEENT, cardiovascular, pulmonary, GI, , musculoskeletal, neuro, skin, endocrine and psych systems are negative, except as otherwise noted.    OBJECTIVE:     /77  Pulse 81  Temp 97.8  F (36.6  C) (Oral)  Ht 5' 4\" (1.626 m)  Wt 197 lb (89.4 kg)  LMP 01/12/2015 (Approximate)  SpO2 99%  Breastfeeding? No  BMI 33.81 kg/m2  Body mass index is 33.81 kg/(m^2).  GENERAL: healthy, alert and no distress  RESP: lungs clear to auscultation - no rales, rhonchi or wheezes  CV: regular rate and rhythm, normal S1 S2, no S3 or S4, no murmur, click or rub, no peripheral edema and peripheral pulses strong  MS: no gross musculoskeletal defects noted, no edema  NEURO: Normal strength and tone, mentation intact and speech normal  PSYCH: mentation appears normal, affect normal/bright          ASSESSMENT/PLAN:     ASSESSMENT / PLAN:  (F39) Mood disorder (H)  (primary encounter diagnosis)  Comment: worsening. see below for plan.  Will start with topamax for mood stabilization (discussed risks and several possible side effects) and consider adding Effexor on after 2-3weeks.  No risk of pregnancy as she had a hysterectomy.  She will f/u with psychiatrist asap.   Plan: MENTAL HEALTH REFERRAL, CBC with platelets         differential, topiramate (TOPAMAX) 25 MG tablet            Patient Instructions "   Re-check in clinic in 3-4 weeks.  Since I will not be available at that time I recommend seeing my colleague Dr. Dumont for this. At that time if you are doing well I would add on a medication similar to zoloft to help with anxiety/depression     schedule with psychiatrist likely they will be months out    Call with side effects or concerns.     If medication makes you feel worse, stop right away and be seen.     If suicidal thoughts or plan occur, call 911 or go to emergency room.     monitor for fatigue, dizziness, severe change in appetite (decreased), or other side effects              Lindy Bingham PA-C  [facility]

## 2017-07-11 ASSESSMENT — ANXIETY QUESTIONNAIRES: GAD7 TOTAL SCORE: 11

## 2017-07-11 ASSESSMENT — PATIENT HEALTH QUESTIONNAIRE - PHQ9: SUM OF ALL RESPONSES TO PHQ QUESTIONS 1-9: 18

## 2017-07-14 DIAGNOSIS — Z13.1 SCREENING FOR DIABETES MELLITUS: ICD-10-CM

## 2017-07-14 DIAGNOSIS — Z13.220 LIPID SCREENING: ICD-10-CM

## 2017-07-14 DIAGNOSIS — F39 MOOD DISORDER (H): ICD-10-CM

## 2017-07-14 LAB
ALBUMIN SERPL-MCNC: 4 G/DL (ref 3.4–5)
ALP SERPL-CCNC: 70 U/L (ref 40–150)
ALT SERPL W P-5'-P-CCNC: 55 U/L (ref 0–50)
ANION GAP SERPL CALCULATED.3IONS-SCNC: 9 MMOL/L (ref 3–14)
AST SERPL W P-5'-P-CCNC: 27 U/L (ref 0–45)
BASOPHILS # BLD AUTO: 0.1 10E9/L (ref 0–0.2)
BASOPHILS NFR BLD AUTO: 0.7 %
BILIRUB SERPL-MCNC: 0.8 MG/DL (ref 0.2–1.3)
BUN SERPL-MCNC: 12 MG/DL (ref 7–30)
CALCIUM SERPL-MCNC: 9.2 MG/DL (ref 8.5–10.1)
CHLORIDE SERPL-SCNC: 107 MMOL/L (ref 94–109)
CHOLEST SERPL-MCNC: 135 MG/DL
CO2 SERPL-SCNC: 22 MMOL/L (ref 20–32)
CREAT SERPL-MCNC: 0.72 MG/DL (ref 0.52–1.04)
DIFFERENTIAL METHOD BLD: NORMAL
EOSINOPHIL # BLD AUTO: 0.2 10E9/L (ref 0–0.7)
EOSINOPHIL NFR BLD AUTO: 1.9 %
ERYTHROCYTE [DISTWIDTH] IN BLOOD BY AUTOMATED COUNT: 13.1 % (ref 10–15)
GFR SERPL CREATININE-BSD FRML MDRD: ABNORMAL ML/MIN/1.7M2
GLUCOSE SERPL-MCNC: 105 MG/DL (ref 70–99)
HCT VFR BLD AUTO: 46.1 % (ref 35–47)
HDLC SERPL-MCNC: 40 MG/DL
HGB BLD-MCNC: 15.6 G/DL (ref 11.7–15.7)
LDLC SERPL CALC-MCNC: 82 MG/DL
LYMPHOCYTES # BLD AUTO: 2.7 10E9/L (ref 0.8–5.3)
LYMPHOCYTES NFR BLD AUTO: 31.5 %
MCH RBC QN AUTO: 30.6 PG (ref 26.5–33)
MCHC RBC AUTO-ENTMCNC: 33.8 G/DL (ref 31.5–36.5)
MCV RBC AUTO: 90 FL (ref 78–100)
MONOCYTES # BLD AUTO: 0.6 10E9/L (ref 0–1.3)
MONOCYTES NFR BLD AUTO: 6.4 %
NEUTROPHILS # BLD AUTO: 5.1 10E9/L (ref 1.6–8.3)
NEUTROPHILS NFR BLD AUTO: 59.5 %
NONHDLC SERPL-MCNC: 95 MG/DL
PLATELET # BLD AUTO: 218 10E9/L (ref 150–450)
POTASSIUM SERPL-SCNC: 4.3 MMOL/L (ref 3.4–5.3)
PROT SERPL-MCNC: 7.8 G/DL (ref 6.8–8.8)
RBC # BLD AUTO: 5.1 10E12/L (ref 3.8–5.2)
SODIUM SERPL-SCNC: 138 MMOL/L (ref 133–144)
TRIGL SERPL-MCNC: 67 MG/DL
WBC # BLD AUTO: 8.6 10E9/L (ref 4–11)

## 2017-07-14 PROCEDURE — 80061 LIPID PANEL: CPT | Performed by: PHYSICIAN ASSISTANT

## 2017-07-14 PROCEDURE — 36415 COLL VENOUS BLD VENIPUNCTURE: CPT | Performed by: PHYSICIAN ASSISTANT

## 2017-07-14 PROCEDURE — 85025 COMPLETE CBC W/AUTO DIFF WBC: CPT | Performed by: PHYSICIAN ASSISTANT

## 2017-07-14 PROCEDURE — 80053 COMPREHEN METABOLIC PANEL: CPT | Performed by: PHYSICIAN ASSISTANT

## 2017-07-17 PROBLEM — F39 MOOD DISORDER (H): Status: ACTIVE | Noted: 2017-07-17

## 2017-07-17 NOTE — PROGRESS NOTES
Marvin Bills,       Your recent test results are attached, if you have any questions or concerns please feel free to contact me via e-mail or call [phone].White and red blood cell counts normal.  No anemia.  Sodium, potassium, kidney and liver enzymes within expected range.   Blood sugar normal, no diabetes.  cholesterol is to goal for you.          It was a pleasure to see you at your recent office visit.      Sincerely,  Lindy Bingham PA-C

## 2017-11-22 ENCOUNTER — NURSE TRIAGE (OUTPATIENT)
Dept: NURSING | Facility: CLINIC | Age: 32
End: 2017-11-22

## 2017-11-22 NOTE — TELEPHONE ENCOUNTER
"  Reason for Disposition    [1] Drinking very little AND [2] dehydration suspected (e.g., no urine > 12 hours, very dry mouth, very lightheaded)    Additional Information    Negative: Shock suspected (e.g., cold/pale/clammy skin, too weak to stand, low BP, rapid pulse)    Negative: Difficult to awaken or acting confused  (e.g., disoriented, slurred speech)    Negative: Sounds like a life-threatening emergency to the triager    Negative: Vomiting occurs only while coughing    Negative: [1] Pregnant < 20 Weeks AND [2] nausea/vomiting began in early pregnancy (i.e., 4-8 weeks pregnant)    Negative: Chest pain    Negative: [1] SEVERE headache AND [2] similar to prior migraines    Negative: [1] Vomiting AND [2] contains red blood or black (\"coffee ground\") material  (Exception: few red streaks in vomit that only happened once)    Negative: Severe pain in one eye    Negative: Recent head injury (within last 3 days)    Negative: Recent abdominal injury (within last 3 days)    Negative: [1] Insulin-dependent diabetes (Type I) AND [2] glucose > 400 mg/dl (22 mmol/l)    Negative: [1] SEVERE vomiting (e.g., 6 or more times/day) AND [2] present > 8 hours    Negative: [1] MODERATE vomiting (e.g., 3 - 5 times/day) AND [2] age > 60    Negative: Severe headache (e.g., excruciating) (Exception: similar to previous migraines)    Negative: High-risk adult (e.g., diabetes mellitus, brain tumor, V-P shunt, hernia)    Protocols used: VOMITING-ADULT-    "

## 2017-11-22 NOTE — TELEPHONE ENCOUNTER
Negar is vomiting every time she eats.  Vomiting is food.   Vomiting has started one month ago.   Negar states that she is barely urinating and urine is dark.

## 2017-12-20 ENCOUNTER — OFFICE VISIT (OUTPATIENT)
Dept: OBGYN | Facility: CLINIC | Age: 32
End: 2017-12-20
Payer: COMMERCIAL

## 2017-12-20 VITALS
HEART RATE: 65 BPM | TEMPERATURE: 97.9 F | SYSTOLIC BLOOD PRESSURE: 113 MMHG | HEIGHT: 64 IN | BODY MASS INDEX: 30.87 KG/M2 | WEIGHT: 180.8 LBS | DIASTOLIC BLOOD PRESSURE: 64 MMHG

## 2017-12-20 DIAGNOSIS — Z11.3 SCREEN FOR STD (SEXUALLY TRANSMITTED DISEASE): Primary | ICD-10-CM

## 2017-12-20 LAB
HBV SURFACE AG SERPL QL IA: NONREACTIVE
HCV AB SERPL QL IA: NONREACTIVE
HIV 1+2 AB+HIV1 P24 AG SERPL QL IA: NONREACTIVE

## 2017-12-20 PROCEDURE — 86780 TREPONEMA PALLIDUM: CPT | Performed by: NURSE PRACTITIONER

## 2017-12-20 PROCEDURE — 86694 HERPES SIMPLEX NES ANTBDY: CPT | Performed by: NURSE PRACTITIONER

## 2017-12-20 PROCEDURE — 36415 COLL VENOUS BLD VENIPUNCTURE: CPT | Performed by: NURSE PRACTITIONER

## 2017-12-20 PROCEDURE — 86696 HERPES SIMPLEX TYPE 2 TEST: CPT | Performed by: NURSE PRACTITIONER

## 2017-12-20 PROCEDURE — 87491 CHLMYD TRACH DNA AMP PROBE: CPT | Performed by: NURSE PRACTITIONER

## 2017-12-20 PROCEDURE — 87389 HIV-1 AG W/HIV-1&-2 AB AG IA: CPT | Performed by: NURSE PRACTITIONER

## 2017-12-20 PROCEDURE — 87340 HEPATITIS B SURFACE AG IA: CPT | Performed by: NURSE PRACTITIONER

## 2017-12-20 PROCEDURE — 86803 HEPATITIS C AB TEST: CPT | Performed by: NURSE PRACTITIONER

## 2017-12-20 PROCEDURE — 87591 N.GONORRHOEAE DNA AMP PROB: CPT | Performed by: NURSE PRACTITIONER

## 2017-12-20 PROCEDURE — 99213 OFFICE O/P EST LOW 20 MIN: CPT | Performed by: NURSE PRACTITIONER

## 2017-12-20 PROCEDURE — 86695 HERPES SIMPLEX TYPE 1 TEST: CPT | Performed by: NURSE PRACTITIONER

## 2017-12-20 ASSESSMENT — PAIN SCALES - GENERAL: PAINLEVEL: NO PAIN (0)

## 2017-12-20 NOTE — PROGRESS NOTES
S: Negar is a 32 year old  1 para 1 presenting today requesting screening for STIs. She had a potential exposure in mid August as she had a new partner and they did not use condoms. Typically using condoms to lower risk of STI transmission. Denies abnormal vaginal discharge, itching, odor, pelvic pain, pain with intercourse, visible lesions. Patient most concerned about possible HSV exposure. Had STI screening in May and IgG for HSV was negative. Has had a hysterectomy-no cervix remaining. No other concerns today. Patient medical, surgical, social, and family history reviewed and updated at today's visit. ROS: 10 point ROS neg other than the symptoms noted above in the HPI.    O: This is a well appearing female in no acute distress. Answers questions and maintains eye contact appropriately. Vital signs noted.  RESPIRATORY: Clear to auscultation bilaterally.  CV: Regular rate and rhythm without murmur, gallop, rub  ABDOMEN: Soft, nontender, nondistended, normoactive bowel sounds. No hepatosplenomegaly. No guarding, rebounding, or rigidity.    A/P:  (Z11.3) Screen for STD (sexually transmitted disease)  (primary encounter diagnosis)  Comment: We discussed safe sexual practices. We reviewed HSV testing including benefits and disadvantages with serum screening, transmission, management if an outbreak were to occur. Await results and treat if indicated. Patient is given an opportunity to ask questions and have them answered.  Plan: Chlamydia trachomatis PCR, Neisseria         gonorrhoeae PCR, Hepatitis B surface antigen,         HIV Antigen Antibody Combo, Herpes Simplex         Virus 1 and 2 IgG, Anti Treponema, Hepatitis C         antibody, HSV IgM antibody         Magaly FINNEY CNP

## 2017-12-20 NOTE — MR AVS SNAPSHOT
"              After Visit Summary   12/20/2017    Aimee M Welander    MRN: [id number]           Patient Information     Date Of Birth          1985        Visit Information        Provider Department      12/20/2017 7:50 AM Magaly Romo APRN CNP [facility]        Today's Diagnoses     Screen for STD (sexually transmitted disease)    -  1       Follow-ups after your visit        Who to contact     If you have questions or need follow up information about today's clinic visit or your schedule please contact [facility] directly at [phone].  Normal or non-critical lab and imaging results will be communicated to you by [employer], letter or phone within 4 business days after the clinic has received the results. If you do not hear from us within 7 days, please contact the clinic through [employer] or phone. If you have a critical or abnormal lab result, we will notify you by phone as soon as possible.  Submit refill requests through [employer] or call your pharmacy and they will forward the refill request to us. Please allow 3 business days for your refill to be completed.          Additional Information About Your Visit        MyChart Information     [employer] gives you secure access to your electronic health record. If you see a primary care provider, you can also send messages to your care team and make appointments. If you have questions, please call your primary care clinic.  If you do not have a primary care provider, please call [phone] and they will assist you.        Care EveryWhere ID     This is your Care EveryWhere ID. This could be used by other organizations to access your [facility] medical records  [id number]        Your Vitals Were     Pulse Temperature Height Last Period BMI (Body Mass Index)       65 97.9  F (36.6  C) (Oral) 5' 4\" (1.626 m) 01/12/2015 (Approximate) 31.03 kg/m2        Blood Pressure from Last 3 Encounters:   12/20/17 113/64   07/10/17 113/77 "   05/12/17 105/73    Weight from Last 3 Encounters:   12/20/17 180 lb 12.8 oz (82 kg)   07/10/17 197 lb (89.4 kg)   05/12/17 196 lb (88.9 kg)              We Performed the Following     Anti Treponema     Chlamydia trachomatis PCR     Hepatitis B surface antigen     Hepatitis C antibody     Herpes Simplex Virus 1 and 2 IgG     HIV Antigen Antibody Combo     HSV IgM antibody     Neisseria gonorrhoeae PCR          Today's Medication Changes          These changes are accurate as of: 12/20/17  7:54 AM.  If you have any questions, ask your nurse or doctor.               Stop taking these medicines if you haven't already. Please contact your care team if you have questions.     lamoTRIgine 25 MG tablet   Commonly known as:  LaMICtal   Stopped by:  Magaly Romo APRN CNP           rizatriptan 5 MG tablet   Commonly known as:  MAXALT   Stopped by:  Magaly Romo APRN CNP           topiramate 25 MG tablet   Commonly known as:  TOPAMAX   Stopped by:  Magaly Romo APRN CNP                    Primary Care Provider Office Phone # Fax #    Corey Alfredo Umanzor [phone] [address] [facility] [address]        Equal Access to Services     JUSTIN RAGSDALE : Hadii aad ku hadasho Soomaali, waaxda luqadaha, qaybta kaalmada adeegyada, sumeet gilliland hayrasheedn faisal kincaid . So [facility] [phone].    ATENCIÓN: Si habla español, tiene a gonzales disposición servicios gratuitos de asistencia lingüística. Llame al [phone].    We comply with applicable federal civil rights laws and [place] laws. We do not discriminate on the basis of race, color, national origin, age, disability, sex, sexual orientation, or gender identity.            Thank you!     Thank you for choosing [facility]  for your care. Our goal is always to provide you with excellent care. Hearing back from our patients is one way we can continue to improve our services. Please take a few minutes to  complete the written survey that you may receive in the mail after your visit with us. Thank you!             Your Updated Medication List - Protect others around you: Learn how to safely use, store and throw away your medicines at www.disposemymeds.org.          This list is accurate as of: 12/20/17  7:54 AM.  Always use your most recent med list.                   Brand Name Dispense Instructions for use Diagnosis    albuterol 108 (90 BASE) MCG/ACT Inhaler    VENTOLIN HFA    1 Inhaler    Inhale 2 puffs into the lungs every 4 hours as needed for shortness of breath / dyspnea or wheezing    Intermittent asthma, uncomplicated       ALPRAZolam 0.5 MG tablet    XANAX    15 tablet    Take 1 tablet (0.5 mg) by mouth 3 times daily as needed for anxiety (#15 to last one month at least)    Anxiety       naproxen 500 MG tablet    NAPROSYN    30 tablet    TAKE 1 TABLET (500 MG) BY MOUTH 2 TIMES DAILY AS NEEDED FOR MODERATE PAIN    Intractable migraine without aura and with status migrainosus       nystatin-triamcinolone ointment    MYCOLOG    30 g    Apply topically 2 times daily    Vulvar pruritus

## 2017-12-20 NOTE — NURSING NOTE
"Chief Complaint   Patient presents with     STD     Testing       Initial /64  Pulse 65  Temp 97.9  F (36.6  C) (Oral)  Ht 5' 4\" (1.626 m)  Wt 180 lb 12.8 oz (82 kg)  LMP 01/12/2015 (Approximate)  BMI 31.03 kg/m2 Estimated body mass index is 31.03 kg/(m^2) as calculated from the following:    Height as of this encounter: 5' 4\" (1.626 m).    Weight as of this encounter: 180 lb 12.8 oz (82 kg)..  BP completed using cuff size: marion Zapien CMA    "

## 2017-12-21 LAB
C TRACH DNA SPEC QL NAA+PROBE: NEGATIVE
HSV1 IGG SERPL QL IA: <0.2 AI (ref 0–0.8)
HSV2 IGG SERPL QL IA: <0.2 AI (ref 0–0.8)
N GONORRHOEA DNA SPEC QL NAA+PROBE: NEGATIVE
SPECIMEN SOURCE: NORMAL
SPECIMEN SOURCE: NORMAL
T PALLIDUM IGG+IGM SER QL: NEGATIVE

## 2017-12-26 LAB — HSV 1+2 IGM SER-IMP: 0.77 INDEX VALUE (ref 0–0.89)

## 2018-05-08 ENCOUNTER — TELEPHONE (OUTPATIENT)
Dept: FAMILY MEDICINE | Facility: CLINIC | Age: 33
End: 2018-05-08

## 2018-05-08 NOTE — TELEPHONE ENCOUNTER
Panel Management Review      Patient has the following on her problem list:     Asthma review     ACT Total Scores 4/28/2017   ACT TOTAL SCORE -   ASTHMA ER VISITS -   ASTHMA HOSPITALIZATIONS -   ACT TOTAL SCORE (Goal Greater than or Equal to 20) 21   In the past 12 months, how many times did you visit the emergency room for your asthma without being admitted to the hospital? 0   In the past 12 months, how many times were you hospitalized overnight because of your asthma? 0      1. Is Asthma diagnosis on the Problem List? Yes    2. Is Asthma listed on Health Maintenance? Yes    3. Patient is due for:  ACT and AAP      Composite cancer screening  Chart review shows that this patient is due/due soon for the following None  Summary:    Patient is due/failing the following:   AAP, ACT, FOLLOW UP and PHQ9    Action needed:   Patient needs office visit for Asthma F/U, Patient needs to do ACT and Patient needs to do PHQ9.    Type of outreach:    Sent letter. and Copy of ACT mailed to patient, will reach out in 5 days.    Questions for provider review:    None                                                                                                                                    Cate Esposito MA     Chart routed to closed.

## 2018-05-08 NOTE — LETTER
Aimee M Welander  [address] [address]  [address]  [place] [address]        May 8, 2018        Dear, Aimee M Welander      Our records indicate that you have not scheduled for a(n)Asthma check  which was recommended by your health care team. Monitoring and managing your preventative and chronic health conditions are very important to us.     Also please fill questionnaire out and mail back to us thank you.    If you have received your health care elsewhere, please provide us with that information so it can be documented in your chart.    Please call [phone] or message us through your [employer] account to schedule an appointment or provide information for your chart.     We look forward to seeing you and working with you on your health care needs.     Sincerely,   BRENDA Reynolds/SUDEEP          *If you have already scheduled an appointment, please disregard this reminder

## 2018-10-09 DIAGNOSIS — G43.011 INTRACTABLE MIGRAINE WITHOUT AURA AND WITH STATUS MIGRAINOSUS: ICD-10-CM

## 2018-10-11 RX ORDER — NAPROXEN 500 MG/1
TABLET ORAL
Qty: 30 TABLET | Refills: 4 | OUTPATIENT
Start: 2018-10-11

## 2018-11-20 NOTE — PROGRESS NOTES
SUBJECTIVE:                                                    Aimee M Welander is a 33 year old female who presents to clinic today for the following health issues:    Depression and Anxiety Follow-Up    Status since last visit: Stable    Other associated symptoms:Notice the depression SX getting a little worst causing anxiety to be up a bit per pt.    Complicating factors:     Significant life event: No     Current substance abuse: None    PHQ 5/12/2017 5/26/2017 7/10/2017   PHQ-9 Total Score 6 7 18   Q9: Suicide Ideation Not at all Not at all Not at all     BELL-7 SCORE 5/12/2017 5/26/2017 7/10/2017   Total Score - - -   Total Score - - -   Total Score 6 10 11       PHQ-9  English  PHQ-9   Any Language  BELL-7  Suicide Assessment Five-step Evaluation and Treatment (SAFE-T)    Amount of exercise or physical activity: None    Problems taking medications regularly: No    Medication side effects: none    Diet: regular (no restrictions)    Previously on zoloft and lamictal.   [place] in the past year.   Has uses xanax prn. Uses very sparingly.     Has undiagnosed bipolar-but describes very high yomi and low depression and these cycle.  She loses relationships because of this cycle.   lamictal made her sick. This was tried due to manic symptoms.  She cycles between symptoms of yomi (not needing sleep, feeling on top of the world), to depressed and sometimes suicidal thoughts occur at that time.   zoloft did not help her improve.     Denies suicidal or homicidal thoughts.  Patient instructed to go to the emergency room or call 911 if these occur.    Had hysterectomy. No concern for pregnancy with any medications.       She has not seen psych in a very long time. Has seen a therapist before.     Fixed dose: Oral: 500 to 750 mg/day (BAP [Rell 2016]; Brett 2008); increase by 250 to 500 mg every 1 to 3 days to reach desired clinical effect and therapeutic serum concentration (BAP [Rell 2016]; [place]  2018). Usually, according to some experts, patients require 1,500 to 2,500 mg/day to achieve this goal (Andrey 2018). Maximum recommended dosage: 60 mg/kg/day.  Problem list and histories reviewed & adjusted, as indicated.  Additional history: as documented    Patient Active Problem List   Diagnosis     Anxiety     Hair loss     Hirsutism     TAI (obstructive sleep apnea)     Post-operative pain     Intermittent asthma, uncomplicated     BMI 33.0-33.9,adult     Mood disorder (H)     Past Surgical History:   Procedure Laterality Date     ABDOMEN SURGERY  08 and 2015     and hysterectomy     DAVINCI HYSTERECTOMY TOTAL, SALPINGECTOMY BILATERAL N/A 2015    Procedure: DAVINCI HYSTERECTOMY TOTAL, SALPINGECTOMY BILATERAL;  Surgeon: Sanchez Cortes MD;  Location: [facility] OR      GYN SURGERY             Social History   Substance Use Topics     Smoking status: Current Every Day Smoker     Packs/day: 1.00     Years: 10.00     Types: Cigarettes     Last attempt to quit: 1/10/2011     Smokeless tobacco: Never Used     Alcohol use 0.0 oz/week      Comment: on occassion     Family History   Problem Relation Age of Onset     Arthritis Mother      Breast Cancer Mother      Depression Mother      Anxiety Disorder Mother      Mental Illness Mother      Substance Abuse Mother      Anesthesia Reaction Mother      Diabetes Father      Depression Father      Diabetes Paternal Grandfather      Osteoporosis Maternal Grandmother      Depression Brother      Depression Sister      Cancer Paternal Uncle      leukemia     Cancer Other      cousin with leukemia     Neurologic Disorder Brother      Spina Bifida     Anxiety Disorder Sister      Other Cancer Maternal Aunt      skin     Anxiety Disorder Daughter      Anesthesia Reaction Sister      Depression Sister      Anxiety Disorder Sister      Anesthesia Reaction Sister      Depression Brother      Anxiety Disorder Daughter          Current Outpatient  "Prescriptions   Medication Sig Dispense Refill     albuterol (VENTOLIN HFA) 108 (90 Base) MCG/ACT inhaler Inhale 2 puffs into the lungs every 4 hours as needed for shortness of breath / dyspnea or wheezing 1 Inhaler 12     ALPRAZolam (XANAX) 0.5 MG tablet Take 1 tablet (0.5 mg) by mouth 3 times daily as needed for anxiety (#15 to last one month at least) 15 tablet 0     naproxen (NAPROSYN) 500 MG tablet TAKE 1 TABLET (500 MG) BY MOUTH 2 TIMES DAILY AS NEEDED FOR MODERATE PAIN 30 tablet 5     nystatin-triamcinolone (MYCOLOG) ointment Apply topically 2 times daily 30 g 1     QUEtiapine (SEROQUEL) 25 MG tablet Take 1 tablet (25 mg) by mouth At Bedtime 30 tablet 1     Valproic Acid 500 MG CPDR Take 1 capful by mouth daily May increase to 2 capsules after 3 days if tolerating well 60 capsule 1     [DISCONTINUED] albuterol (VENTOLIN HFA) 108 (90 BASE) MCG/ACT Inhaler Inhale 2 puffs into the lungs every 4 hours as needed for shortness of breath / dyspnea or wheezing 1 Inhaler 12     Allergies   Allergen Reactions     Amoxicillin Swelling     Throat swelling, trouble breathing.     Penicillins Swelling     Sulfa Drugs Nausea and Vomiting       ROS:  Constitutional, HEENT, cardiovascular, pulmonary, GI, , musculoskeletal, neuro, skin, endocrine and psych systems are negative, except as otherwise noted.    OBJECTIVE:     /64  Pulse 73  Temp 98.8  F (37.1  C) (Oral)  Resp 16  Ht 5' 4\" (1.626 m)  Wt 172 lb (78 kg)  LMP 01/12/2015 (Approximate)  SpO2 97%  Breastfeeding? No  BMI 29.52 kg/m2  Body mass index is 29.52 kg/(m^2).  GENERAL: healthy, alert and no distress  RESP: lungs clear to auscultation - no rales, rhonchi or wheezes  CV: regular rate and rhythm, normal S1 S2, no S3 or S4, no murmur, click or rub, no peripheral edema and peripheral pulses strong  MS: no gross musculoskeletal defects noted, no edema  NEURO: Normal strength and tone, mentation intact and speech normal  PSYCH: mentation appears normal, " affect normal/bright        ASSESSMENT/PLAN:     ASSESSMENT / PLAN:  (J45.20) Intermittent asthma, uncomplicated  (primary encounter diagnosis)  Comment:   Plan: albuterol (VENTOLIN HFA) 108 (90 Base) MCG/ACT         inhaler        stable    (F41.9) Anxiety  Comment: stable  Plan: ALPRAZolam (XANAX) 0.5 MG tablet        We discussed side effects and risks of this medication today including addiction, tolerance, increased sedation, respiratory depression, and possibly overdose if not taken as directed.   They will not mix this medication with alcohol or other sedating medications. They will not drive after taking this medication.  Patient states understanding. They verbally agreed to use their medication responsibly.     No fills w/o office visit    (G43.011) Intractable migraine without aura and with status migrainosus  Comment:   Plan: naproxen (NAPROSYN) 500 MG tablet        stable    (L29.2) Vulvar pruritus  Comment:   Plan: nystatin-triamcinolone (MYCOLOG) ointment        stable    (Z23) Need for Tdap vaccination  Comment:   Plan: TDAP VACCINE (ADACEL), ADMIN 1st VACCINE            (F39) Mood disorder (H)  Comment: worsening needs improvement  Plan: Valproic Acid 500 MG CPDR, QUEtiapine         (SEROQUEL) 25 MG tablet, MENTAL HEALTH REFERRAL        - Adult; Psychiatry and Medication Management;         Psychiatry; [facility]: [facility] Psychiatry         Service [phone].  Medication management         & future refills will be returned to G PCP         upon completion of evaluation; We callie...          Schedule with psych  In the meantime I will try to help manage  To emergency room with suicidal/homicidal thoughts  Recheck 1 month but in 3 days via mychart so we can titrate valproic acid as tolerated   If suicidal thoughts occur stop medication immediately  Do not drink alcohol or drive after taking xanax or seroquel      (F51.01) Primary insomnia  Comment:  Will try low dose seroquel per patient  medications make her very sleepy, side effects discussed  Plan: QUEtiapine (SEROQUEL) 25 MG tablet, MENTAL         HEALTH REFERRAL  - Adult; Psychiatry and         Medication Management; Psychiatry; [facility]:         [facility] Psychiatry Service [phone].  Medication management & future         refills will be returned to [facility] PCP upon         completion of evaluation; We callie...            Will need to monitor levels  Monitoring Parameters   Liver enzymes (at baseline and frequently during therapy especially during the first 6 months), CBC with platelets (baseline and periodic intervals), PT/PTT (especially prior to surgery), serum ammonia (with symptoms of lethargy, mental status change), serum valproate levels; suicidality (eg, suicidal thoughts, depression, behavioral changes); motor and cognitive function (for signs or symptoms of brain atrophy)      Lindy Bingham PA-C  [facility]

## 2018-11-23 ENCOUNTER — MYC MEDICAL ADVICE (OUTPATIENT)
Dept: FAMILY MEDICINE | Facility: CLINIC | Age: 33
End: 2018-11-23

## 2018-11-23 ENCOUNTER — OFFICE VISIT (OUTPATIENT)
Dept: FAMILY MEDICINE | Facility: CLINIC | Age: 33
End: 2018-11-23
Payer: COMMERCIAL

## 2018-11-23 VITALS
HEIGHT: 64 IN | TEMPERATURE: 98.8 F | HEART RATE: 73 BPM | BODY MASS INDEX: 29.37 KG/M2 | DIASTOLIC BLOOD PRESSURE: 64 MMHG | SYSTOLIC BLOOD PRESSURE: 114 MMHG | WEIGHT: 172 LBS | OXYGEN SATURATION: 97 % | RESPIRATION RATE: 16 BRPM

## 2018-11-23 DIAGNOSIS — J45.20 INTERMITTENT ASTHMA, UNCOMPLICATED: Primary | ICD-10-CM

## 2018-11-23 DIAGNOSIS — L29.2 VULVAR PRURITUS: ICD-10-CM

## 2018-11-23 DIAGNOSIS — Z23 NEED FOR TDAP VACCINATION: ICD-10-CM

## 2018-11-23 DIAGNOSIS — F51.01 PRIMARY INSOMNIA: ICD-10-CM

## 2018-11-23 DIAGNOSIS — G43.011 INTRACTABLE MIGRAINE WITHOUT AURA AND WITH STATUS MIGRAINOSUS: ICD-10-CM

## 2018-11-23 DIAGNOSIS — F41.9 ANXIETY: ICD-10-CM

## 2018-11-23 DIAGNOSIS — F39 MOOD DISORDER (H): ICD-10-CM

## 2018-11-23 PROCEDURE — 99214 OFFICE O/P EST MOD 30 MIN: CPT | Mod: 25 | Performed by: PHYSICIAN ASSISTANT

## 2018-11-23 PROCEDURE — 90715 TDAP VACCINE 7 YRS/> IM: CPT | Performed by: PHYSICIAN ASSISTANT

## 2018-11-23 PROCEDURE — 90471 IMMUNIZATION ADMIN: CPT | Performed by: PHYSICIAN ASSISTANT

## 2018-11-23 RX ORDER — QUETIAPINE FUMARATE 25 MG/1
25 TABLET, FILM COATED ORAL AT BEDTIME
Qty: 30 TABLET | Refills: 1 | Status: SHIPPED | OUTPATIENT
Start: 2018-11-23

## 2018-11-23 RX ORDER — ALPRAZOLAM 0.5 MG
0.5 TABLET ORAL 3 TIMES DAILY PRN
Qty: 15 TABLET | Refills: 0 | Status: SHIPPED | OUTPATIENT
Start: 2018-11-23

## 2018-11-23 RX ORDER — ALBUTEROL SULFATE 90 UG/1
2 AEROSOL, METERED RESPIRATORY (INHALATION) EVERY 4 HOURS PRN
Qty: 1 INHALER | Refills: 12 | Status: SHIPPED | OUTPATIENT
Start: 2018-11-23 | End: 2019-12-31

## 2018-11-23 RX ORDER — NYSTATIN AND TRIAMCINOLONE ACETONIDE 100000; 1 [USP'U]/G; MG/G
OINTMENT TOPICAL 2 TIMES DAILY
Qty: 30 G | Refills: 1 | Status: SHIPPED | OUTPATIENT
Start: 2018-11-23

## 2018-11-23 RX ORDER — NAPROXEN 500 MG/1
TABLET ORAL
Qty: 30 TABLET | Refills: 5 | Status: SHIPPED | OUTPATIENT
Start: 2018-11-23

## 2018-11-23 ASSESSMENT — ANXIETY QUESTIONNAIRES
1. FEELING NERVOUS, ANXIOUS, OR ON EDGE: MORE THAN HALF THE DAYS
6. BECOMING EASILY ANNOYED OR IRRITABLE: NEARLY EVERY DAY
5. BEING SO RESTLESS THAT IT IS HARD TO SIT STILL: MORE THAN HALF THE DAYS
GAD7 TOTAL SCORE: 16
IF YOU CHECKED OFF ANY PROBLEMS ON THIS QUESTIONNAIRE, HOW DIFFICULT HAVE THESE PROBLEMS MADE IT FOR YOU TO DO YOUR WORK, TAKE CARE OF THINGS AT HOME, OR GET ALONG WITH OTHER PEOPLE: VERY DIFFICULT
2. NOT BEING ABLE TO STOP OR CONTROL WORRYING: NEARLY EVERY DAY
3. WORRYING TOO MUCH ABOUT DIFFERENT THINGS: MORE THAN HALF THE DAYS
7. FEELING AFRAID AS IF SOMETHING AWFUL MIGHT HAPPEN: MORE THAN HALF THE DAYS

## 2018-11-23 ASSESSMENT — PATIENT HEALTH QUESTIONNAIRE - PHQ9
5. POOR APPETITE OR OVEREATING: MORE THAN HALF THE DAYS
SUM OF ALL RESPONSES TO PHQ QUESTIONS 1-9: 18

## 2018-11-23 NOTE — NURSING NOTE
"Chief Complaint   Patient presents with     Anxiety     med check      Depression     Health Maintenance     orders pended       Initial /64  Pulse 73  Temp 98.8  F (37.1  C) (Oral)  Resp 16  Ht 5' 4\" (1.626 m)  Wt 172 lb (78 kg)  LMP 01/12/2015 (Approximate)  SpO2 97%  Breastfeeding? No  BMI 29.52 kg/m2 Estimated body mass index is 29.52 kg/(m^2) as calculated from the following:    Height as of this encounter: 5' 4\" (1.626 m).    Weight as of this encounter: 172 lb (78 kg).  Medication Reconciliation: complete      Cate Esposito MA    "

## 2018-11-23 NOTE — MR AVS SNAPSHOT
After Visit Summary   11/23/2018    Aimee M Welander    MRN: [id number]           Patient Information     Date Of Birth          1985        Visit Information        Provider Department      11/23/2018 9:20 AM Lindy Bingham PA-C [facility]        Today's Diagnoses     Intermittent asthma, uncomplicated    -  1    Anxiety        Intractable migraine without aura and with status migrainosus        Vulvar pruritus        Need for Tdap vaccination        Mood disorder (H)        Primary insomnia           Follow-ups after your visit        Additional Services     MENTAL HEALTH REFERRAL  - Adult; Psychiatry and Medication Management; Psychiatry; [facility]: [facility] Psychiatry Service [phone].  Medication management & future refills will be returned to G PCP upon completion of evaluation; We callie...       All scheduling is subject to the client's specific insurance plan & benefits, provider/location availability, and provider clinical specialities.  Please arrive 15 minutes early for your first appointment and bring your completed paperwork.  ? bipolar    Please be aware that coverage of these services is subject to the terms and limitations of your health insurance plan.  Call member services at your health plan with any benefit or coverage questions.                  Follow-up notes from your care team     Return in about 3 days (around 11/26/2018).      Who to contact     If you have questions or need follow up information about today's clinic visit or your schedule please contact [facility] directly at [phone].  Normal or non-critical lab and imaging results will be communicated to you by MyChart, letter or phone within 4 business days after the clinic has received the results. If you do not hear from us within 7 days, please contact the clinic through MyChart or phone. If you have a critical or abnormal lab result, we will notify you by phone  "as soon as possible.  Submit refill requests through [employer] or call your pharmacy and they will forward the refill request to us. Please allow 3 business days for your refill to be completed.          Additional Information About Your Visit        XATAhar[employer]ser Information     [employer] gives you secure access to your electronic health record. If you see a primary care provider, you can also send messages to your care team and make appointments. If you have questions, please call your primary care clinic.  If you do not have a primary care provider, please call [phone] and they will assist you.        Care EveryWhere ID     This is your Care EveryWhere ID. This could be used by other organizations to access your [facility] medical records  [id number]        Your Vitals Were     Pulse Temperature Respirations Height Last Period Pulse Oximetry    73 98.8  F (37.1  C) (Oral) 16 5' 4\" (1.626 m) 01/12/2015 (Approximate) 97%    Breastfeeding? BMI (Body Mass Index)                No 29.52 kg/m2           Blood Pressure from Last 3 Encounters:   11/23/18 114/64   12/20/17 113/64   07/10/17 113/77    Weight from Last 3 Encounters:   11/23/18 172 lb (78 kg)   12/20/17 180 lb 12.8 oz (82 kg)   07/10/17 197 lb (89.4 kg)              We Performed the Following     ADMIN 1st VACCINE     MENTAL HEALTH REFERRAL  - Adult; Psychiatry and Medication Management; Psychiatry; G: [facility] Psychiatry Service [phone].  Medication management & future refills will be returned to G PCP upon completion of evaluation; We callie...     TDAP VACCINE (ADACEL)          Today's Medication Changes          These changes are accurate as of 11/23/18  9:46 AM.  If you have any questions, ask your nurse or doctor.               Start taking these medicines.        Dose/Directions    QUEtiapine 25 MG tablet   Commonly known as:  SEROQUEL   Used for:  Mood disorder (H), Primary insomnia   Started by:  Lindy Bingham PA-C        " Dose:  25 mg   Take 1 tablet (25 mg) by mouth At Bedtime   Quantity:  30 tablet   Refills:  1       Valproic Acid 500 MG Cpdr   Used for:  Mood disorder (H)   Started by:  Lindy Bingham PA-C        Dose:  1 capful   Take 1 capful by mouth daily May increase to 2 capsules after 3 days if tolerating well   Quantity:  60 capsule   Refills:  1            Where to get your medicines      These medications were sent to [place] [address] [place], [place] - [address] [facility]  [address] [facility] [address]     Phone:  [phone]     albuterol 108 (90 Base) MCG/ACT inhaler    naproxen 500 MG tablet    nystatin-triamcinolone ointment    QUEtiapine 25 MG tablet    Valproic Acid 500 MG Cpdr         Some of these will need a paper prescription and others can be bought over the counter.  Ask your nurse if you have questions.     Bring a paper prescription for each of these medications     ALPRAZolam 0.5 MG tablet                Primary Care Provider Office Phone # Fax #    Corey Umanzor [phone] [address] [address]        Equal Access to Services     [facility] FarmJULI AH: Hadii stevo morales hadasho Soalmaali, waaxda luqadaha, qaybta kaalmada adeegyada, sumeet gilliland hayduran kincaid . So [facility] [phone].    ATENCIÓN: Si habla español, tiene a gonzales disposición servicios gratLos Alamos Medical Centeros de asistencia lingüística. Llame al [phone].    We comply with applicable federal civil rights laws and [place] laws. We do not discriminate on the basis of race, color, national origin, age, disability, sex, sexual orientation, or gender identity.            Thank you!     Thank you for choosing [facility]  for your care. Our goal is always to provide you with excellent care. Hearing back from our patients is one way we can continue to improve our services. Please take a few minutes to complete the written survey that you may receive in the mail after  your visit with us. Thank you!             Your Updated Medication List - Protect others around you: Learn how to safely use, store and throw away your medicines at www.disposemymeds.org.          This list is accurate as of 11/23/18  9:46 AM.  Always use your most recent med list.                   Brand Name Dispense Instructions for use Diagnosis    albuterol 108 (90 Base) MCG/ACT inhaler    VENTOLIN HFA    1 Inhaler    Inhale 2 puffs into the lungs every 4 hours as needed for shortness of breath / dyspnea or wheezing    Intermittent asthma, uncomplicated       ALPRAZolam 0.5 MG tablet    XANAX    15 tablet    Take 1 tablet (0.5 mg) by mouth 3 times daily as needed for anxiety (#15 to last one month at least)    Anxiety       naproxen 500 MG tablet    NAPROSYN    30 tablet    TAKE 1 TABLET (500 MG) BY MOUTH 2 TIMES DAILY AS NEEDED FOR MODERATE PAIN    Intractable migraine without aura and with status migrainosus       nystatin-triamcinolone ointment    MYCOLOG    30 g    Apply topically 2 times daily    Vulvar pruritus       QUEtiapine 25 MG tablet    SEROQUEL    30 tablet    Take 1 tablet (25 mg) by mouth At Bedtime    Mood disorder (H), Primary insomnia       Valproic Acid 500 MG Cpdr     60 capsule    Take 1 capful by mouth daily May increase to 2 capsules after 3 days if tolerating well    Mood disorder (H)

## 2018-11-23 NOTE — LETTER
My Asthma Action Plan  Name: Aimee M Welander   YOB: 1985  Date: 11/20/2018   My doctor: Lindy Bingham PA-C   My clinic: [facility]        My Control Medicine: None  My Rescue Medicine: Albuterol (Proair/Ventolin/Proventil) inhaler prn   My Asthma Severity: intermittent  Avoid your asthma triggers: upper respiratory infections               GREEN ZONE   Good Control    I feel good    No cough or wheeze    Can work, sleep and play without asthma symptoms       Take your asthma control medicine every day.     1. If exercise triggers your asthma, take your rescue medication    15 minutes before exercise or sports, and    During exercise if you have asthma symptoms  2. Spacer to use with inhaler: If you have a spacer, make sure to use it with your inhaler             YELLOW ZONE Getting Worse  I have ANY of these:    I do not feel good    Cough or wheeze    Chest feels tight    Wake up at night   1. Keep taking your Green Zone medications  2. Start taking your rescue medicine:    every 20 minutes for up to 1 hour. Then every 4 hours for 24-48 hours.  3. If you stay in the Yellow Zone for more than 12-24 hours, contact your doctor.  4. If you do not return to the Green Zone in 12-24 hours or you get worse, start taking your oral steroid medicine if prescribed by your provider.           RED ZONE Medical Alert - Get Help  I have ANY of these:    I feel awful    Medicine is not helping    Breathing getting harder    Trouble walking or talking    Nose opens wide to breathe       1. Take your rescue medicine NOW  2. If your provider has prescribed an oral steroid medicine, start taking it NOW  3. Call your doctor NOW  4. If you are still in the Red Zone after 20 minutes and you have not reached your doctor:    Take your rescue medicine again and    Call 911 or go to the emergency room right away    See your regular doctor within 2 weeks of an Emergency Room or Urgent Care visit for  follow-up treatment.          Annual Reminders:  Meet with Asthma Educator,  Flu Shot in the Fall, consider Pneumonia Vaccination for patients with asthma (aged 19 and older).    Pharmacy:    [id number] [place] [facility] [address] [place] [address]  [id number] [place] [facility] [place], [place] - [address] [facility] [address]                      Asthma Triggers  How To Control Things That Make Your Asthma Worse    Triggers are things that make your asthma worse.  Look at the list below to help you find your triggers and what you can do about them.  You can help prevent asthma flare-ups by staying away from your triggers.      Trigger                                                          What you can do   Cigarette Smoke  Tobacco smoke can make asthma worse. Do not allow smoking in your home, car or around you.  Be sure no one smokes at a child s day care or school.  If you smoke, ask your health care provider for ways to help you quit.  Ask family members to quit too.  Ask your health care provider for a referral to Quit Plan to help you quit smoking, or call 5-770-468-PLAN.     Colds, Flu, Bronchitis  These are common triggers of asthma. Wash your hands often.  Don t touch your eyes, nose or mouth.  Get a flu shot every year.     Dust Mites  These are tiny bugs that live in cloth or carpet. They are too small to see. Wash sheets and blankets in hot water every week.   Encase pillows and mattress in dust mite proof covers.  Avoid having carpet if you can. If you have carpet, vacuum weekly.   Use a dust mask and HEPA vacuum.   Pollen and Outdoor Mold  Some people are allergic to trees, grass, or weed pollen, or molds. Try to keep your windows closed.  Limit time out doors when pollen count is high.   Ask you health care provider about taking medicine during allergy season.     Animal Dander  Some people are allergic to skin flakes, urine or saliva from pets with fur or feathers. Keep pets with fur or feathers out of your home.    If  you can t keep the pet outdoors, then keep the pet out of your bedroom.  Keep the bedroom door closed.  Keep pets off cloth furniture and away from stuffed toys.     Mice, Rats, and Cockroaches  Some people are allergic to the waste from these pests.   Cover food and garbage.  Clean up spills and food crumbs.  Store grease in the refrigerator.   Keep food out of the bedroom.   Indoor Mold  This can be a trigger if your home has high moisture. Fix leaking faucets, pipes, or other sources of water.   Clean moldy surfaces.  Dehumidify basement if it is damp and smelly.   Smoke, Strong Odors, and Sprays  These can reduce air quality. Stay away from strong odors and sprays, such as perfume, powder, hair spray, paints, smoke incense, paint, cleaning products, candles and new carpet.   Exercise or Sports  Some people with asthma have this trigger. Be active!  Ask your doctor about taking medicine before sports or exercise to prevent symptoms.    Warm up for 5-10 minutes before and after sports or exercise.     Other Triggers of Asthma  Cold air:  Cover your nose and mouth with a scarf.  Sometimes laughing or crying can be a trigger.  Some medicines and food can trigger asthma.

## 2018-11-23 NOTE — LETTER
My Depression Action Plan  Name: Aimee M Welander   Date of Birth 1985  Date: 11/20/2018    My doctor: Corey Umanzor   My clinic: [facility]  [id number] [place] [id number] [phone]          GREEN    ZONE   Good Control    What it looks like:     Things are going generally well. You have normal up s and down s. You may even feel depressed from time to time, but bad moods usually last less than a day.   What you need to do:  1. Continue to care for yourself (see self care plan)  2. Check your depression survival kit and update it as needed  3. Follow your physician s recommendations including any medication.  4. Do not stop taking medication unless you consult with your physician first.           YELLOW         ZONE Getting Worse    What it looks like:     Depression is starting to interfere with your life.     It may be hard to get out of bed; you may be starting to isolate yourself from others.    Symptoms of depression are starting to last most all day and this has happened for several days.     You may have suicidal thoughts but they are not constant.   What you need to do:     1. Call your care team, your response to treatment will improve if you keep your care team informed of your progress. Yellow periods are signs an adjustment may need to be made.     2. Continue your self-care, even if you have to fake it!    3. Talk to someone in your support network    4. Open up your depression survival kit           RED    ZONE Medical Alert - Get Help    What it looks like:     Depression is seriously interfering with your life.     You may experience these or other symptoms: You can t get out of bed most days, can t work or engage in other necessary activities, you have trouble taking care of basic hygiene, or basic responsibilities, thoughts of suicide or death that will not go away, self-injurious behavior.     What you need to do:  1. Call your care team and  request a same-day appointment. If they are not available (weekends or after hours) call your local crisis line, emergency room or 911.            Depression Self Care Plan / Survival Kit    Self-Care for Depression  Here s the deal. Your body and mind are really not as separate as most people think.  What you do and think affects how you feel and how you feel influences what you do and think. This means if you do things that people who feel good do, it will help you feel better.  Sometimes this is all it takes.  There is also a place for medication and therapy depending on how severe your depression is, so be sure to consult with your medical provider and/ or Behavioral Health Consultant if your symptoms are worsening or not improving.     In order to better manage my stress, I will:    Exercise  Get some form of exercise, every day. This will help reduce pain and release endorphins, the  feel good  chemicals in your brain. This is almost as good as taking antidepressants!  This is not the same as joining a gym and then never going! (they count on that by the way ) It can be as simple as just going for a walk or doing some gardening, anything that will get you moving.      Hygiene   Maintain good hygiene (Get out of bed in the morning, Make your bed, Brush your teeth, Take a shower, and Get dressed like you were going to work, even if you are unemployed).  If your clothes don't fit try to get ones that do.    Diet  I will strive to eat foods that are good for me, drink plenty of water, and avoid excessive sugar, caffeine, alcohol, and other mood-altering substances.  Some foods that are helpful in depression are: complex carbohydrates, B vitamins, flaxseed, fish or fish oil, fresh fruits and vegetables.    Psychotherapy  I agree to participate in Individual Therapy (if recommended).    Medication  If prescribed medications, I agree to take them.  Missing doses can result in serious side effects.  I understand that  drinking alcohol, or other illicit drug use, may cause potential side effects.  I will not stop my medication abruptly without first discussing it with my provider.    Staying Connected With Others  I will stay in touch with my friends, family members, and my primary care provider/team.    Use your imagination  Be creative.  We all have a creative side; it doesn t matter if it s oil painting, sand castles, or mud pies! This will also kick up the endorphins.    Witness Beauty  (AKA stop and smell the roses) Take a look outside, even in mid-winter. Notice colors, textures. Watch the squirrels and birds.     Service to others  Be of service to others.  There is always someone else in need.  By helping others we can  get out of ourselves  and remember the really important things.  This also provides opportunities for practicing all the other parts of the program.    Humor  Laugh and be silly!  Adjust your TV habits for less news and crime-drama and more comedy.    Control your stress  Try breathing deep, massage therapy, biofeedback, and meditation. Find time to relax each day.     My support system    Clinic Contact:  Phone number:    Contact 1:  Phone number:    Contact 2:  Phone number:    Scientologist/:  Phone number:    Therapist:  Phone number:    Local crisis center:    Phone number:    Other community support:  Phone number:

## 2018-11-24 ASSESSMENT — ASTHMA QUESTIONNAIRES: ACT_TOTALSCORE: 14

## 2018-11-24 ASSESSMENT — ANXIETY QUESTIONNAIRES: GAD7 TOTAL SCORE: 16

## 2018-11-26 ENCOUNTER — TELEPHONE (OUTPATIENT)
Dept: FAMILY MEDICINE | Facility: CLINIC | Age: 33
End: 2018-11-26

## 2018-11-26 ENCOUNTER — MYC MEDICAL ADVICE (OUTPATIENT)
Dept: FAMILY MEDICINE | Facility: CLINIC | Age: 33
End: 2018-11-26

## 2018-11-26 NOTE — LETTER
December 18, 2018      Aimee M Welander  [address] [address]  [address]  [place] [address]      Dear Negar,     Your clinic record indicates that you are due for an asthma update. We have a survey tool called an ACT (or Asthma Control Test) we use to measure the level of control of your asthma. Please complete the enclosed questionnaire and mail it back to us in the self-addressed stamped envelope.     If you have questions about this letter please contact your provider.     Sincerely,       Your [facility] Team

## 2018-11-26 NOTE — TELEPHONE ENCOUNTER
Patient was in to see Lindy Bingham and failed their ACT by scoring 14. Please put in the failed ACT workflow for follow-up. Thank you.

## 2019-01-15 ENCOUNTER — OFFICE VISIT (OUTPATIENT)
Dept: PSYCHIATRY | Facility: CLINIC | Age: 34
End: 2019-01-15
Attending: PHYSICIAN ASSISTANT
Payer: COMMERCIAL

## 2019-01-15 VITALS
HEART RATE: 82 BPM | SYSTOLIC BLOOD PRESSURE: 96 MMHG | WEIGHT: 171 LBS | BODY MASS INDEX: 29.19 KG/M2 | DIASTOLIC BLOOD PRESSURE: 60 MMHG | RESPIRATION RATE: 16 BRPM | OXYGEN SATURATION: 98 % | TEMPERATURE: 98.3 F | HEIGHT: 64 IN

## 2019-01-15 DIAGNOSIS — F43.10 PTSD (POST-TRAUMATIC STRESS DISORDER): Primary | ICD-10-CM

## 2019-01-15 PROCEDURE — 90792 PSYCH DIAG EVAL W/MED SRVCS: CPT | Performed by: NURSE PRACTITIONER

## 2019-01-15 ASSESSMENT — MIFFLIN-ST. JEOR: SCORE: 1475.65
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
## 2019-02-13 DIAGNOSIS — F41.9 ANXIETY: ICD-10-CM

## 2019-02-14 RX ORDER — ALPRAZOLAM 0.5 MG
TABLET ORAL
Qty: 15 TABLET | Refills: 0 | OUTPATIENT
Start: 2019-02-14

## 2019-02-14 NOTE — TELEPHONE ENCOUNTER
Controlled Substance Refill Request for alprazolam  Problem List Complete:  No     PROVIDER TO CONSIDER COMPLETION OF PROBLEM LIST AND OVERVIEW/CONTROLLED SUBSTANCE AGREEMENT    Last Written Prescription Date:  11/23/18  Last Fill Quantity: 15,   # refills: 0      Last Office Visit with [facility] [place]: 11/23/18    Future Office visit:     Controlled substance agreement:   Encounter-Level CSA:    There are no encounter-level csa.     Patient-Level CSA:    There are no patient-level csa.         Last Urine Drug Screen: No results found for: CDAUT, No results found for: COMDAT, No results found for: THC13, PCP13, COC13, MAMP13, OPI13, AMP13, BZO13, TCA13, MTD13, BAR13, OXY13, PPX13, BUP13     Processing:  Fax Rx to [facility]     [email or web address]       checked in past 3 months?  Yes 2/14/19   Chasity FRANCOISN, RN

## 2019-09-11 ENCOUNTER — VIRTUAL VISIT (OUTPATIENT)
Dept: FAMILY MEDICINE | Facility: OTHER | Age: 34
End: 2019-09-11

## 2019-09-11 NOTE — PROGRESS NOTES
"Date:   Clinician: Tyree Braga  Clinician NPI: [id number]  Patient: Negar Orta  Patient : 1985  [facility] [place] [facility] [place] [address] [place] [id number]  Patient Phone: [phone]  Visit Protocol: URI  Patient Summary:  Negar is a 33 year old ( : 1985 ) female who initiated a Visit for cold, sinus infection, or influenza. When asked the question \"Please sign me up to receive news, health information and promotions from [employer].\", Negar responded \"Yes\".    Negar states her symptoms started suddenly 2-3 weeks ago. After her symptoms started, they improved and then got worse again.   Her symptoms consist of rhinitis, tooth pain, a headache, malaise, facial pain or pressure, myalgia, chills, nasal congestion, and ear pain. She is experiencing mild difficulty breathing with activities but can speak normally in full sentences. Negar also feels feverish but was unable to measure her temperature.   Symptom details     Nasal secretions: The color of her mucus is green and clear.    Facial pain or pressure: The facial pain or pressure does not feel worse when bending or leaning forward.     Headache: She states the headache is mild (1-3 on a 10 point pain scale).     Tooth pain: The tooth pain is not caused by a cavity, recent dental work, or other mouth problems.      Negar denies having wheezing, sore throat, and cough. She also denies having recent facial or sinus surgery in the past 60 days and taking antibiotic medication for the symptoms.   Precipitating events  She has not recently been exposed to someone with influenza. Negar has been in close contact with the following high risk individuals: people with asthma, heart disease or diabetes.   Pertinent medical history  Negar had 2 sinus infections within the past year.   Negar does not get yeast infections when she takes antibiotics.   Weight: 179 lbs   Negar does not smoke or use smokeless tobacco.   She denies pregnancy and denies " breastfeeding. She does not menstruate.     MEDICATIONS: No current medications, ALLERGIES: amoxicillin, Penicillins, Sulfa (Sulfonamide Antibiotics)  Clinician Response:  Dear Negar,  Based on the information provided, you have acute bacterial sinusitis, also known as a sinus infection. Sinus infections are caused by bacteria or a virus and symptoms are almost always identical. The difference between the 2 types of infections is timing.  Sinus infections start as viral infections and symptoms improve on their own in about 7 days. If symptoms have not improved after 7 days or have even worsened, a bacterial infection may have developed.  Medication information  I am prescribing:     Azithromycin (Zithromax Z-Abdi) 250 mg oral tablet. Take 2 tablets by mouth on day 1, then 1 tablet by mouth on days 2-5. There are no refills with this prescription.   Yeast infections can be a common side effect of antibiotics. The most common symptom of a yeast infection is itchiness in and around the vagina. Other signs and symptoms include burning, redness, or a thick, white vaginal discharge that looks like cottage cheese and does not have a bad smell.  If you become pregnant during this course of treatment, stop taking the medication and contact your primary care provider.  Self care  The following tips will keep you as comfortable as possible while you recover:     Rest    Drink plenty of water and other liquids    Take a hot shower to loosen congestion     When to seek care  Please be seen in a clinic or urgent care if any of the following occur:     Symptoms do not start to improve after 3 days of treatment    New symptoms develop, or symptoms become worse     It is possible to have an allergic reaction to an antibiotic even if you have not had one in the past. If you notice a new rash, significant swelling, or difficulty breathing, stop taking this medication immediately and go to a clinic or urgent care.   Diagnosis: Acute  bacterial sinusitis  Diagnosis ICD: J01.90  Prescription: azithromycin (Zithromax Z-Abdi) 250 mg oral tablet 6 tablet, 5 days supply. Take 2 tablets by mouth on day 1, then 1 tablet by mouth on days 2-5. Refills: 0, Refill as needed: no, Allow substitutions: yes  [id number] - [phone] - [address] [address]

## 2019-11-02 ENCOUNTER — VIRTUAL VISIT (OUTPATIENT)
Dept: FAMILY MEDICINE | Facility: OTHER | Age: 34
End: 2019-11-02

## 2019-11-03 ENCOUNTER — HEALTH MAINTENANCE LETTER (OUTPATIENT)
Age: 34
End: 2019-11-03

## 2019-11-05 NOTE — PROGRESS NOTES
"Date: 2019 15:28:41  Clinician: Terri Richardson  Clinician NPI: [id number]  Patient: Negar Orta  Patient : 1985  Patient Address: [facility] [place] [address]  Patient Phone: [phone]  Visit Protocol: UTI  Patient Summary:  Negar is a 34 year old ( : 1985 ) female who initiated a Visit for a presumed bladder infection. When asked the question \"Please sign me up to receive news, health information and promotions from [employer].\", Negar responded \"No\".   Her symptoms started 4-6 days ago and consist of urinary frequency, urgency, vaginal itching, dysuria, foul-smelling urine, nausea, and feeling as if the bladder is never empty.   Symptom details   Urine color: The color of her urine is yellow.    Denied symptoms include chills, vaginal discharge, urinary incontinence, vomiting, flank pain, and abdominal pain. She does not feel feverish.   Negar has not used any over-the-counter medications or home remedies to relieve her current symptoms.  Precipitating events  Negar denies having a sexually transmitted disease.  Pertinent medical history  Negar has had a bladder infection before and has had 1 in the past 12 months. Her most recent bladder infection was not within the last 4 weeks. Her current symptoms are similar to her previous bladder infection symptoms.   She is not sure what antibiotics have been effective in treating her past bladder infections.   She has a history of kidney stones. Her last episode was more than 6 months ago.   Negar has not been prescribed antibiotics to prevent frequent or repeated bladder infections in the past and does not get yeast infections when she takes antibiotics. She has not experienced problems or side effects with any of the common antibiotics used to treat bladder infections.   She has not used a catheter or been a patient in a hospital or nursing home in the past 2 weeks.   Negar does not smoke or use smokeless tobacco.   She denies pregnancy and " denies breastfeeding. She does not menstruate.     MEDICATIONS: No current medications, ALLERGIES: Sulfa (Sulfonamide Antibiotics), Penicillins, amoxicillin  Clinician Response:  Dear Negar,  Based on the information you have provided, you likely have an acute urinary tract infection, also called a bladder infection. Bladder infections occur when bacteria from the outside of the body enters the urinary tract. Any part of the urinary system can be infected, but the bladder is the most common.  Medication information  I am prescribing:     Nitrofurantoin monohyd/m-cryst (Macrobid) 100 mg oral capsule. Take 1 capsule by mouth every 12 hours for 5 days. Take this medication with food. There are no refills with this prescription.   The medication I prescribed for your bladder infection is an antibiotic. Continue taking the medication until it is gone even if you feel better.   Yeast infections can be a common side effect of antibiotics. The most common symptom of a yeast infection is itchiness in and around the vagina. Other signs and symptoms include burning, redness, or a thick, white vaginal discharge that looks like cottage cheese and does not have a bad smell.  Self care  Urination helps to flush bacteria from the urinary tract. For this reason, drinking water and urinating often helps relieve some urinary symptoms and can decrease your risk of getting bladder infections in the future.  Other steps you can take to prevent future bladder infections include:     Wipe front to back after using the bathroom    Urinate after sexual intercourse    Avoid using deodorant sprays, douches, or powders in the vaginal area     When to seek care  Please make an appointment to be seen in a clinic or urgent care if any of the following occur:     You develop new symptoms or your symptoms become worse    You have medication side effects that make it difficult to take them as prescribed    Your symptoms do not improve within 1-2 days  of starting treatment    You have symptoms of a bladder infection that return shortly after completing treatment     It is possible to have an allergic reaction to an antibiotic even if you have not had one in the past. If you notice a new rash, significant swelling, or difficulty breathing, stop taking this medication immediately and go to a clinic or urgent care.   Diagnosis: Acute uncomplicated bladder infection  Diagnosis ICD: N39.0  Prescription: nitrofurantoin monohyd/m-cryst (Macrobid) 100 mg oral capsule 10 capsule, 5 days supply. Take 1 capsule by mouth every 12 hours for 5 days. Refills: 0, Refill as needed: no, Allow substitutions: yes  [id number] - [phone] - [address] [address]

## 2019-12-28 DIAGNOSIS — J45.20 INTERMITTENT ASTHMA, UNCOMPLICATED: ICD-10-CM

## 2019-12-28 NOTE — LETTER
December 31, 2019    Aimee M Welander  [address] [address]  [facility] [address]    Dear Negar,       We recently received a refill request for VENTOLIN  (90 Base) MCG/ACT inhaler.  We have refilled this for a one time supply only because you are due for a:    Asthma office visit      Please call at your earliest convenience so that there will not be a delay with your future refills.          Thank you,   Your [facility] Team/mkr  [phone]

## 2019-12-29 ENCOUNTER — VIRTUAL VISIT (OUTPATIENT)
Dept: FAMILY MEDICINE | Facility: OTHER | Age: 34
End: 2019-12-29

## 2019-12-30 NOTE — PROGRESS NOTES
"Date: 2019 10:40:43  Clinician: Tyree Braga  Clinician NPI: [id number]  Patient: Negar Orta  Patient : 1985  Patient Address[place] [facility] [address] [place] [id number]  Patient Phone: [phone]  Visit Protocol: URI  Patient Summary:  Negar is a 34 year old ( : 1985 ) female who initiated a Visit for cold, sinus infection, or influenza. When asked the question \"Please sign me up to receive news, health information and promotions from [employer].\", Negar responded \"No\".    Negar states her symptoms started suddenly 3-6 days ago. After her symptoms started, they improved and then got worse again.   Her symptoms consist of a headache, nasal congestion, malaise, a cough, and myalgia. She is experiencing mild difficulty breathing with activities but can speak normally in full sentences.   Symptom details     Nasal secretions: The color of her mucus is clear.    Cough: Negar coughs a few times an hour and her cough is not more bothersome at night. Phlegm does not come into her throat when she coughs.     Headache: She states the headache is mild (1-3 on a 10 point pain scale).      Negar denies having sore throat, ear pain, enlarged lymph nodes, chills, fever, rhinitis, wheezing, teeth pain, and facial pain or pressure. She also denies taking antibiotic medication for the symptoms and having recent facial or sinus surgery in the past 60 days.   Precipitating events  She has recently been exposed to someone with influenza. Negar has been in close contact with the following high risk individuals: people with asthma, heart disease or diabetes.   Pertinent medical history  Negar had 1 sinus infection within the past year.   Negar typically gets a yeast infection when she takes antibiotics. She has not used fluconazole (Diflucan) to treat previous yeast infections.   Weight: 190 lbs   Negar does not smoke or use smokeless tobacco.   She denies pregnancy and denies breastfeeding. She does not menstruate.   " Additional information as reported by the patient (free text): Despite no dehydration symptoms (I'm still urinating, etc) my body feels constantly dry   Weight: 190 lbs    MEDICATIONS: Ventolin HFA inhalation, ALLERGIES: Sulfa (Sulfonamide Antibiotics), Penicillins, amoxicillin  Clinician Response:  Dear Negar,  Based on the information provided, you have viral sinusitis, also known as a sinus infection. Sinus infections are caused by bacteria or a virus and symptoms are almost always identical. The difference between the 2 types of infections is timing.  Sinus infections start as viral infections and symptoms improve on their own in about 7 days. If symptoms have not improved after 7 days or have even worsened, a bacterial infection may have developed.  Medication information  Because you have a viral infection, antibiotics will not help you get better. Treating a viral infection with antibiotics could actually make you feel worse.  For more information on why I am not prescribing antibiotics, please watch this video: Antibiotics Aren't Always the Answer.  I am prescribing:       Fluticasone 50 mcg/actuation nasal spray. Inhale 2 sprays in each nostril 1 time per day; after 1 week, may adjust to 1 - 2 sprays in each nostril 1 time per day. This medication takes several days to start working, so keep taking it even if it doesn't help right away. There are no refills with this prescription.      Benzonatate (Tessalon Perles) 100 mg oral capsule. Take 1-2 capsules by mouth 3 times per day as needed for your cough. There are no refills with this prescription.     Self care  The following tips will keep you as comfortable as possible while you recover:     Rest    Drink plenty of water and other liquids    Take a hot shower to loosen congestion    Take a spoonful of honey to reduce your cough     When to seek care  Please be seen in a clinic or urgent care if new symptoms develop, or symptoms become worse.   Diagnosis:  Viral sinusitis  Diagnosis ICD: J01.90  Prescription: benzonatate (Tessalon Perles) 100 mg oral capsule 30 capsule, 5 days supply. Take 1-2 capsules by mouth 3 times per day as needed. Refills: 0, Refill as needed: no, Allow substitutions: yes  Prescription: fluticasone 50 mcg/actuation nasal spray,suspension 1 120 spray aerosol with adapter (grams), 30 days supply. Inhale 2 sprays in each nostril 1 time per day; after 1 week, may adjust to 1 - 2 sprays in each nostril 1 time per day.. Refills: 0, Refill as needed: no, Allow substitutions: yes  [id number] - [phone] - [address] [place] [id number]

## 2019-12-31 RX ORDER — ALBUTEROL SULFATE 90 UG/1
AEROSOL, METERED RESPIRATORY (INHALATION)
Qty: 1 INHALER | Refills: 10 | Status: SHIPPED | OUTPATIENT
Start: 2019-12-31

## 2019-12-31 NOTE — TELEPHONE ENCOUNTER
A 30-day supply only is refilled as patient is overdue for appointment    TC, patient due for:  OV for asthma    Lucie Farrell BSN, RN

## 2020-01-20 ENCOUNTER — TELEPHONE (OUTPATIENT)
Dept: FAMILY MEDICINE | Facility: CLINIC | Age: 35
End: 2020-01-20

## 2020-01-20 NOTE — TELEPHONE ENCOUNTER
My chart message along with PHQ9 and BELL sent to patient to complete and return.    PHQ-9 due now for patient   Index date:       11/23/2018  Index PHQ9 :   18  FU start date:   9/24/2019  FU End date :   1/22/2020    Estefany Gardiner, [facility]

## 2020-01-28 NOTE — TELEPHONE ENCOUNTER
Patient returned PHQ-9 with score:    PHQ-9 SCORE 7/10/2017 11/23/2018 1/20/2020   PHQ-9 Total Score - - -   PHQ-9 Total Score MyChart - - 11 (Moderate depression)   PHQ-9 Total Score 18 18 11         Routing to PCP as FYI or further advisement.

## 2020-08-31 NOTE — TELEPHONE ENCOUNTER
Patient failed their ACT today. Please have the patient follow-up in 2 months for a recheck.     Thank you,  Makenzie James MD    [FreeTextEntry1] : Long discussion regarding all options and risks\par Bowel prep written and explained\par Scheduled for colonoscopy on 9/22/20

## 2020-11-16 ENCOUNTER — HEALTH MAINTENANCE LETTER (OUTPATIENT)
Age: 35
End: 2020-11-16

## 2021-09-18 ENCOUNTER — HEALTH MAINTENANCE LETTER (OUTPATIENT)
Age: 36
End: 2021-09-18

## 2022-01-08 ENCOUNTER — HEALTH MAINTENANCE LETTER (OUTPATIENT)
Age: 37
End: 2022-01-08

## 2022-11-19 ENCOUNTER — HEALTH MAINTENANCE LETTER (OUTPATIENT)
Age: 37
End: 2022-11-19

## 2023-05-25 NOTE — PROGRESS NOTES
Progress Note    Client Name: Aimee M Welander  Date: 3/3/17         Service Type: Individual      Session Start Time: 10:00  Session End Time: 10:48      Session Length: 38-52     Session #: 14     Attendees: Client    Treatment Plan Last Reviewed: n/a  PHQ-9 / BELL-7: completed  LOCUS / CASII: n/a     DATA      Progress Since Last Session (Related to Symptoms / Goals / Homework):   Symptoms: Stable    Homework: Achieved / completed to satisfaction      Episode of Care Goals: Satisfactory progress - ACTION (Actively working towards change); Intervened by reinforcing change plan / affirming steps taken     Current / Ongoing Stressors and Concerns:  Client had many emotional events this week and seems to be handling them well.  Discussed therapist's transition out of [facility] in June.  Client expressed desire to pursue services elsewhere at that time.  Started reprocessing and she noticed significant movement in thoughts and levels of disturbance.  Initial LILIAM rating was 7 and it reduced to zero at end of session.  Reprocessing was incomplete due to time.     Treatment Objective(s) Addressed in This Session:   Client will use bilateral stimulation while recalling distressing memories and focusing on new, helpful thoughts and feelings.       Intervention:   EMDR: reprocessing with bilateral stimulation.          ASSESSMENT: Current Emotional / Mental Status (status of significant symptoms):   Risk status (Self / Other harm or suicidal ideation)    Client denies current fears or concerns for personal safety.  Client denies current or recent suicidal ideation or behaviors.   Client denies current or recent homicidal ideation or behaviors.  Client denies current or recent self injurious behavior or ideation.  Client denies other safety concerns.  Client reports there are no firearms in the house.  A safety and risk management plan has been developed.     Appearance:   Appropriate     Eye Contact:   Fair    Psychomotor Behavior: Normal    Attitude:   Cooperative    Orientation:   All   Speech    Rate / Production: Normal     Volume:  Normal    Mood:    Anxious  Normal   Affect:    Appropriate    Thought Content:  Clear    Thought Form:  Coherent  Logical    Insight:    Fair      Medication Review:   No changes to current psychiatric medication(s)     Medication Compliance:   No client stopped all psychotropic medications     Changes in Health Issues:   None reported     Chemical Use Review:   Substance Use: Chemical use reviewed, no active concerns identified      Tobacco Use: Yes, decrease.  Client reports frequency of use daily with an e-cigarette. Provided support and affirmation for steps taken towards quiting      Collateral Reports Completed:   Not Applicable    PLAN: (Client Tasks / Therapist Tasks / Other)  1. Continue EMDR reprocessing    Kendra MARSHElsa Gilliland, LMFT                                                        ________________________________________________________________________    Treatment Plan    Client's Name: Aimee M Welander  YOB: 1985    Date: 9/9/16    Diagnoses: 296.32 Major Depressive Disorder, Recurrent Episode, Moderate _  300.00 (F41.9) Unspecified Anxiety Disorder  Psychosocial & Contextual Factors: relationship conflicts, trauma history  WHODAS: 29    Referral / Collaboration:  Referral to another professional/service is not indicated at this time..    Anticipated number of session or this episode of care: 12-24      MeasurableTreatment Goal(s) related to diagnosis / functional impairment(s)  Goal 1: Client will keep self safe and eliminate suicidal ideation and self-harm behaviors.    Objective #A (Client Action)    Client will make a list of at least 10 skills or activities that you will to use to distract from urges to harm self.  Status: Completed - Date: 12/16/16     Intervention(s)  Therapist will provide ideas and strategies for  generating coping skills list.    Objective #B  Client will make a list of pros and cons for tolerating and not tolerating an urge to harm self.  Status: Completed - Date: 12/16/16     Intervention(s)  Therapist will guide client through DBT-style Pros/Cons list for behavior change.    Objective #C  Client will identify and practice the new strategies for dealing with strong emotions, learn and practice relaxation breathing.  Status: Continued - Date(s): 12/16/16     Intervention(s)  Therapist will teach distraction skills. Practice them within session and outside of session.    Goal 2: Client will report reduction in anxiety also as evidenced by reduction of BELL-7 score below 6 points within the next 12 weeks.    Objective #A (Client Action)    Client will identify at least three triggers for anxiety.  Status: Completed - Date: 12/16/16     Intervention(s)  Therapist will provide educational materials on common triggers and signs of anxiety.    Objective #B  Client will use relaxation strategies at least two times per day to reduce the physical symptoms of anxiety.  Status: Continued - Date(s): 12/16/16     Intervention(s)  Therapist will teach relaxation strategies such as mindfulness, deep breathing, muscle relaxation, and sensory activities.    Objective #C  Client will use cognitive strategies identified in therapy to challenge anxious thoughts.  Status: Continued - Date(s): 12/16/16     Intervention(s)  Therapist will teach strategies for cognitive modification using REBT model.    Goal 3: Client will report improved mood as indicated by PHQ-9 score below 6 for consistent 8 weeks.    Objective #A (Client Action)    Status: Continued- Date: 12/16/16    Client will Increase interest, engagement, and pleasure in doing things.    Intervention(s)  Therapist will assign homework for daily involvement in pleasant activities.    Objective #B  Client will Identify negative self-talk and behaviors: challenge core  beliefs, myths, and actions.    Status: Continued - Date(s): 12/16/16     Intervention(s)  Therapist will teach strategies for cognitive modification using REBT models.    Objective #C  Client will Improve quantity and quality of night time sleep.  Status: Continued - Date(s):12/16/16     Intervention(s)  Therapist will teach sleep hygiene strategies.  Assign homework for daily practice.    Goal 4: Client will report reduced trauma responses including decreased re-experiencing, decreased avoidance, and decreased hyperarousal.    Objective #A (Client Action)    Client will successful maintain focus during imageries such as Container, Calm Place, and Light Stream.  Status: New - Date: 2/3/17     Intervention(s)  Therapist will provide practice of imagery and mindfulness during session.  Assign homework for practice of the imagery outside of session.    Objective #B  Client will provide history of trauma or distressing events and how these impact the cognitions and feelings about self.    Status: New - Date: 2/3/17     Intervention(s)  Therapist will assist client in identifying how the past stressors are impacting currenting cognitive, behavioral, and emotional functioning.    Objective #C  Client will use bilateral stimulation while recalling distressing memories and focusing on new, helpful thoughts and feelings.  Status: New - Date: 2/3/17     Intervention(s)  Therapist will provide bilateral stimulation through touch or eye movement and elicit feedback from client about SUDs scale ratings.      Client has reviewed and agreed to the above plan.      Kendra Gilliland, LMFT  September 9, 2016     Attending to bill

## 2023-06-01 ENCOUNTER — HEALTH MAINTENANCE LETTER (OUTPATIENT)
Age: 38
End: 2023-06-01